# Patient Record
Sex: FEMALE | Race: WHITE | NOT HISPANIC OR LATINO | Employment: OTHER | URBAN - METROPOLITAN AREA
[De-identification: names, ages, dates, MRNs, and addresses within clinical notes are randomized per-mention and may not be internally consistent; named-entity substitution may affect disease eponyms.]

---

## 2023-02-20 LAB
ALANINE AMINOTRANSFERASE (SGPT) (U/L) IN SER/PLAS: 12 U/L (ref 7–45)
ALBUMIN (G/DL) IN SER/PLAS: 4.1 G/DL (ref 3.4–5)
ALKALINE PHOSPHATASE (U/L) IN SER/PLAS: 88 U/L (ref 33–136)
ANION GAP IN SER/PLAS: 13 MMOL/L (ref 10–20)
ASPARTATE AMINOTRANSFERASE (SGOT) (U/L) IN SER/PLAS: 15 U/L (ref 9–39)
BASOPHILS (10*3/UL) IN BLOOD BY AUTOMATED COUNT: 0.05 X10E9/L (ref 0–0.1)
BASOPHILS/100 LEUKOCYTES IN BLOOD BY AUTOMATED COUNT: 0.8 % (ref 0–2)
BILIRUBIN TOTAL (MG/DL) IN SER/PLAS: 0.8 MG/DL (ref 0–1.2)
CALCIDIOL (25 OH VITAMIN D3) (NG/ML) IN SER/PLAS: 47 NG/ML
CALCIUM (MG/DL) IN SER/PLAS: 9.4 MG/DL (ref 8.6–10.6)
CARBON DIOXIDE, TOTAL (MMOL/L) IN SER/PLAS: 31 MMOL/L (ref 21–32)
CHLORIDE (MMOL/L) IN SER/PLAS: 103 MMOL/L (ref 98–107)
CHOLESTEROL (MG/DL) IN SER/PLAS: 168 MG/DL (ref 0–199)
CHOLESTEROL IN HDL (MG/DL) IN SER/PLAS: 75.4 MG/DL
CHOLESTEROL/HDL RATIO: 2.2
CREATININE (MG/DL) IN SER/PLAS: 0.69 MG/DL (ref 0.5–1.05)
EOSINOPHILS (10*3/UL) IN BLOOD BY AUTOMATED COUNT: 0.37 X10E9/L (ref 0–0.4)
EOSINOPHILS/100 LEUKOCYTES IN BLOOD BY AUTOMATED COUNT: 6.2 % (ref 0–6)
ERYTHROCYTE DISTRIBUTION WIDTH (RATIO) BY AUTOMATED COUNT: 14.2 % (ref 11.5–14.5)
ERYTHROCYTE MEAN CORPUSCULAR HEMOGLOBIN CONCENTRATION (G/DL) BY AUTOMATED: 32.4 G/DL (ref 32–36)
ERYTHROCYTE MEAN CORPUSCULAR VOLUME (FL) BY AUTOMATED COUNT: 97 FL (ref 80–100)
ERYTHROCYTES (10*6/UL) IN BLOOD BY AUTOMATED COUNT: 4.44 X10E12/L (ref 4–5.2)
ESTIMATED AVERAGE GLUCOSE FOR HBA1C: 103 MG/DL
GFR FEMALE: 90 ML/MIN/1.73M2
GLUCOSE (MG/DL) IN SER/PLAS: 96 MG/DL (ref 74–99)
HEMATOCRIT (%) IN BLOOD BY AUTOMATED COUNT: 42.9 % (ref 36–46)
HEMOGLOBIN (G/DL) IN BLOOD: 13.9 G/DL (ref 12–16)
HEMOGLOBIN A1C/HEMOGLOBIN TOTAL IN BLOOD: 5.2 %
IMMATURE GRANULOCYTES/100 LEUKOCYTES IN BLOOD BY AUTOMATED COUNT: 0.2 % (ref 0–0.9)
LDL: 69 MG/DL (ref 0–99)
LEUKOCYTES (10*3/UL) IN BLOOD BY AUTOMATED COUNT: 6 X10E9/L (ref 4.4–11.3)
LYMPHOCYTES (10*3/UL) IN BLOOD BY AUTOMATED COUNT: 1.98 X10E9/L (ref 0.8–3)
LYMPHOCYTES/100 LEUKOCYTES IN BLOOD BY AUTOMATED COUNT: 33.2 % (ref 13–44)
MONOCYTES (10*3/UL) IN BLOOD BY AUTOMATED COUNT: 0.79 X10E9/L (ref 0.05–0.8)
MONOCYTES/100 LEUKOCYTES IN BLOOD BY AUTOMATED COUNT: 13.2 % (ref 2–10)
NEUTROPHILS (10*3/UL) IN BLOOD BY AUTOMATED COUNT: 2.77 X10E9/L (ref 1.6–5.5)
NEUTROPHILS/100 LEUKOCYTES IN BLOOD BY AUTOMATED COUNT: 46.4 % (ref 40–80)
NRBC (PER 100 WBCS) BY AUTOMATED COUNT: 0 /100 WBC (ref 0–0)
PLATELETS (10*3/UL) IN BLOOD AUTOMATED COUNT: 184 X10E9/L (ref 150–450)
POTASSIUM (MMOL/L) IN SER/PLAS: 4.5 MMOL/L (ref 3.5–5.3)
PROTEIN TOTAL: 7.1 G/DL (ref 6.4–8.2)
SODIUM (MMOL/L) IN SER/PLAS: 142 MMOL/L (ref 136–145)
THYROTROPIN (MIU/L) IN SER/PLAS BY DETECTION LIMIT <= 0.05 MIU/L: 2.05 MIU/L (ref 0.44–3.98)
TRIGLYCERIDE (MG/DL) IN SER/PLAS: 116 MG/DL (ref 0–149)
UREA NITROGEN (MG/DL) IN SER/PLAS: 19 MG/DL (ref 6–23)
VLDL: 23 MG/DL (ref 0–40)

## 2023-03-13 ENCOUNTER — TELEPHONE (OUTPATIENT)
Dept: PRIMARY CARE | Facility: CLINIC | Age: 73
End: 2023-03-13
Payer: MEDICARE

## 2023-03-13 NOTE — TELEPHONE ENCOUNTER
Pt informed of results. Wants to hold off on taking any bisphosphonate at this time.    ----- Message from Shabnam Bobo PA-C sent at 3/9/2023  9:47 PM EST -----  Regarding: DEXA bone scan results  DEXA bone scan results show osteopenia (weakening of the bones) in the left femur but normal bone density in the spine.  Her FRAX score is above goal which means that she is at a high risk for 10-year major osteoporotic fracture or hip fracture.  It is recommended that she begin oral bisphosphonate therapy weekly.  Please let me know if she is interested and I will call into the pharmacy.  Otherwise if not interested then I recommend she take calcium 600 mg plus vitamin D at least 400 units twice daily.  Participate in weightbearing exercises as tolerated.

## 2023-08-10 ENCOUNTER — OFFICE VISIT (OUTPATIENT)
Dept: PRIMARY CARE | Facility: CLINIC | Age: 73
End: 2023-08-10
Payer: MEDICARE

## 2023-08-10 VITALS
SYSTOLIC BLOOD PRESSURE: 123 MMHG | BODY MASS INDEX: 53.92 KG/M2 | HEART RATE: 61 BPM | WEIGHT: 293 LBS | HEIGHT: 62 IN | OXYGEN SATURATION: 97 % | DIASTOLIC BLOOD PRESSURE: 68 MMHG

## 2023-08-10 DIAGNOSIS — N32.81 OVERACTIVE BLADDER: ICD-10-CM

## 2023-08-10 DIAGNOSIS — Z12.31 ENCOUNTER FOR SCREENING MAMMOGRAM FOR BREAST CANCER: ICD-10-CM

## 2023-08-10 DIAGNOSIS — I89.0 LYMPHEDEMA: ICD-10-CM

## 2023-08-10 DIAGNOSIS — F32.9 MAJOR DEPRESSIVE DISORDER, REMISSION STATUS UNSPECIFIED, UNSPECIFIED WHETHER RECURRENT: ICD-10-CM

## 2023-08-10 DIAGNOSIS — Z00.00 ROUTINE GENERAL MEDICAL EXAMINATION AT HEALTH CARE FACILITY: ICD-10-CM

## 2023-08-10 DIAGNOSIS — I10 ESSENTIAL HYPERTENSION: ICD-10-CM

## 2023-08-10 DIAGNOSIS — Z00.00 MEDICARE ANNUAL WELLNESS VISIT, SUBSEQUENT: Primary | ICD-10-CM

## 2023-08-10 DIAGNOSIS — R06.00 NOCTURNAL DYSPNEA: ICD-10-CM

## 2023-08-10 DIAGNOSIS — Z13.89 ENCOUNTER FOR SCREENING FOR OTHER DISORDER: ICD-10-CM

## 2023-08-10 PROBLEM — J20.9 BRONCHITIS, ACUTE: Status: ACTIVE | Noted: 2023-08-10

## 2023-08-10 PROBLEM — M25.569 JOINT PAIN, KNEE: Status: ACTIVE | Noted: 2023-08-10

## 2023-08-10 PROBLEM — E66.9 OBESITY: Status: ACTIVE | Noted: 2023-08-10

## 2023-08-10 PROBLEM — M25.512 LEFT SHOULDER PAIN: Status: ACTIVE | Noted: 2023-08-10

## 2023-08-10 PROBLEM — E55.9 VITAMIN D DEFICIENCY: Status: ACTIVE | Noted: 2023-08-10

## 2023-08-10 PROBLEM — R39.15 URINARY URGENCY: Status: ACTIVE | Noted: 2023-08-10

## 2023-08-10 PROBLEM — R39.89 URINE TROUBLES: Status: ACTIVE | Noted: 2023-08-10

## 2023-08-10 PROBLEM — M10.9 GOUT: Status: ACTIVE | Noted: 2023-08-10

## 2023-08-10 PROBLEM — M51.369 LUMBAR DEGENERATIVE DISC DISEASE: Status: ACTIVE | Noted: 2023-08-10

## 2023-08-10 PROBLEM — U07.1 DISEASE DUE TO SEVERE ACUTE RESPIRATORY SYNDROME CORONAVIRUS 2 (SARS-COV-2): Status: ACTIVE | Noted: 2023-08-10

## 2023-08-10 PROBLEM — R79.9 ABNORMAL BLOOD CHEMISTRY: Status: ACTIVE | Noted: 2023-08-10

## 2023-08-10 PROBLEM — M54.50 LUMBAR PAIN: Status: ACTIVE | Noted: 2023-08-10

## 2023-08-10 PROBLEM — R05.9 COUGH: Status: ACTIVE | Noted: 2023-08-10

## 2023-08-10 PROBLEM — K63.5 COLON POLYPS: Status: ACTIVE | Noted: 2023-08-10

## 2023-08-10 PROBLEM — F32.A DEPRESSION: Status: ACTIVE | Noted: 2023-08-10

## 2023-08-10 PROBLEM — M19.90 ARTHRITIS: Status: ACTIVE | Noted: 2023-08-10

## 2023-08-10 PROBLEM — I87.2 CHRONIC VENOUS INSUFFICIENCY: Status: ACTIVE | Noted: 2023-08-10

## 2023-08-10 PROBLEM — J45.909 ASTHMATIC BRONCHITIS (HHS-HCC): Status: ACTIVE | Noted: 2023-08-10

## 2023-08-10 PROBLEM — M19.90 OSTEOARTHRITIS: Status: ACTIVE | Noted: 2023-08-10

## 2023-08-10 PROBLEM — M54.2 NECK PAIN: Status: ACTIVE | Noted: 2023-08-10

## 2023-08-10 PROBLEM — R01.1 HEART MURMUR: Status: ACTIVE | Noted: 2023-08-10

## 2023-08-10 PROBLEM — M51.36 LUMBAR DEGENERATIVE DISC DISEASE: Status: ACTIVE | Noted: 2023-08-10

## 2023-08-10 PROBLEM — M76.72 PERONEAL TENDONITIS OF LEFT LOWER EXTREMITY: Status: ACTIVE | Noted: 2023-08-10

## 2023-08-10 PROBLEM — D05.02 LOBULAR CARCINOMA IN SITU (LCIS) OF LEFT BREAST: Status: ACTIVE | Noted: 2023-08-10

## 2023-08-10 PROBLEM — L08.9 SKIN INFECTION, BACTERIAL: Status: ACTIVE | Noted: 2023-08-10

## 2023-08-10 PROBLEM — M25.551 RIGHT HIP PAIN: Status: ACTIVE | Noted: 2023-08-10

## 2023-08-10 PROBLEM — R35.0 URINARY FREQUENCY: Status: ACTIVE | Noted: 2023-08-10

## 2023-08-10 PROBLEM — E66.812 CLASS 2 SEVERE OBESITY DUE TO EXCESS CALORIES WITH SERIOUS COMORBIDITY IN ADULT: Status: ACTIVE | Noted: 2023-08-10

## 2023-08-10 PROBLEM — Z15.09: Status: ACTIVE | Noted: 2023-08-10

## 2023-08-10 PROBLEM — R60.9 EDEMA: Status: ACTIVE | Noted: 2023-08-10

## 2023-08-10 PROBLEM — M21.40 PES PLANUS: Status: ACTIVE | Noted: 2023-08-10

## 2023-08-10 PROBLEM — J96.00 ACUTE RESPIRATORY FAILURE (MULTI): Status: ACTIVE | Noted: 2023-08-10

## 2023-08-10 PROBLEM — R91.8 LUNG NODULES: Status: ACTIVE | Noted: 2023-08-10

## 2023-08-10 PROBLEM — E66.01 CLASS 2 SEVERE OBESITY DUE TO EXCESS CALORIES WITH SERIOUS COMORBIDITY IN ADULT (MULTI): Status: ACTIVE | Noted: 2023-08-10

## 2023-08-10 PROBLEM — R30.0 BURNING WITH URINATION: Status: ACTIVE | Noted: 2023-08-10

## 2023-08-10 PROBLEM — R53.83 FATIGUE: Status: ACTIVE | Noted: 2023-08-10

## 2023-08-10 PROBLEM — M25.572 ACUTE LEFT ANKLE PAIN: Status: ACTIVE | Noted: 2023-08-10

## 2023-08-10 PROBLEM — B96.89 SKIN INFECTION, BACTERIAL: Status: ACTIVE | Noted: 2023-08-10

## 2023-08-10 PROBLEM — N39.0 UTI (URINARY TRACT INFECTION): Status: ACTIVE | Noted: 2023-08-10

## 2023-08-10 PROBLEM — R06.2 WHEEZING: Status: ACTIVE | Noted: 2023-08-10

## 2023-08-10 PROBLEM — R00.2 PALPITATIONS: Status: ACTIVE | Noted: 2023-08-10

## 2023-08-10 PROBLEM — H60.591: Status: ACTIVE | Noted: 2023-08-10

## 2023-08-10 PROBLEM — R92.8 ABNORMAL MAMMOGRAM: Status: ACTIVE | Noted: 2023-08-10

## 2023-08-10 PROBLEM — R04.0 EPISTAXIS: Status: ACTIVE | Noted: 2023-08-10

## 2023-08-10 PROBLEM — M79.673 FOOT PAIN: Status: ACTIVE | Noted: 2023-08-10

## 2023-08-10 PROBLEM — M43.00 SPONDYLOLYSIS: Status: ACTIVE | Noted: 2023-08-10

## 2023-08-10 PROBLEM — J35.01 CHRONIC TONSILLITIS: Status: ACTIVE | Noted: 2023-08-10

## 2023-08-10 PROBLEM — K21.9 ESOPHAGEAL REFLUX: Status: ACTIVE | Noted: 2023-08-10

## 2023-08-10 PROBLEM — H92.09 OTALGIA: Status: ACTIVE | Noted: 2023-08-10

## 2023-08-10 PROBLEM — J31.0 CHRONIC RHINITIS: Status: ACTIVE | Noted: 2023-08-10

## 2023-08-10 PROBLEM — R07.89 ATYPICAL CHEST PAIN: Status: ACTIVE | Noted: 2023-08-10

## 2023-08-10 PROBLEM — M85.80 OSTEOPENIA: Status: ACTIVE | Noted: 2023-08-10

## 2023-08-10 PROBLEM — T14.8XXA BLISTER: Status: ACTIVE | Noted: 2023-08-10

## 2023-08-10 PROBLEM — R50.9 FEVER: Status: ACTIVE | Noted: 2023-08-10

## 2023-08-10 PROBLEM — R60.9 LIPOEDEMA: Status: ACTIVE | Noted: 2023-08-10

## 2023-08-10 PROBLEM — M54.9 NOTALGIA: Status: ACTIVE | Noted: 2023-08-10

## 2023-08-10 PROCEDURE — 1036F TOBACCO NON-USER: CPT | Performed by: PHYSICIAN ASSISTANT

## 2023-08-10 PROCEDURE — 99214 OFFICE O/P EST MOD 30 MIN: CPT | Performed by: PHYSICIAN ASSISTANT

## 2023-08-10 PROCEDURE — G0444 DEPRESSION SCREEN ANNUAL: HCPCS | Performed by: PHYSICIAN ASSISTANT

## 2023-08-10 PROCEDURE — G0439 PPPS, SUBSEQ VISIT: HCPCS | Performed by: PHYSICIAN ASSISTANT

## 2023-08-10 PROCEDURE — 3078F DIAST BP <80 MM HG: CPT | Performed by: PHYSICIAN ASSISTANT

## 2023-08-10 PROCEDURE — 1123F ACP DISCUSS/DSCN MKR DOCD: CPT | Performed by: PHYSICIAN ASSISTANT

## 2023-08-10 PROCEDURE — 1159F MED LIST DOCD IN RCRD: CPT | Performed by: PHYSICIAN ASSISTANT

## 2023-08-10 PROCEDURE — 1170F FXNL STATUS ASSESSED: CPT | Performed by: PHYSICIAN ASSISTANT

## 2023-08-10 PROCEDURE — 3074F SYST BP LT 130 MM HG: CPT | Performed by: PHYSICIAN ASSISTANT

## 2023-08-10 PROCEDURE — 1160F RVW MEDS BY RX/DR IN RCRD: CPT | Performed by: PHYSICIAN ASSISTANT

## 2023-08-10 PROCEDURE — 1033F TOBACCO NONSMOKER NOR 2NDHND: CPT | Performed by: PHYSICIAN ASSISTANT

## 2023-08-10 PROCEDURE — 1125F AMNT PAIN NOTED PAIN PRSNT: CPT | Performed by: PHYSICIAN ASSISTANT

## 2023-08-10 RX ORDER — ESCITALOPRAM OXALATE 10 MG/1
10 TABLET ORAL DAILY
Qty: 90 TABLET | Refills: 3 | Status: SHIPPED | OUTPATIENT
Start: 2023-08-10 | End: 2024-02-13 | Stop reason: WASHOUT

## 2023-08-10 RX ORDER — ACETAMINOPHEN 500 MG
1 TABLET ORAL DAILY
COMMUNITY

## 2023-08-10 RX ORDER — HYDROCHLOROTHIAZIDE 25 MG/1
25 TABLET ORAL DAILY
Qty: 90 TABLET | Refills: 1 | Status: SHIPPED | OUTPATIENT
Start: 2023-08-10 | End: 2024-02-01

## 2023-08-10 RX ORDER — DOXYCYCLINE 100 MG/1
CAPSULE ORAL
COMMUNITY
Start: 2023-05-16 | End: 2023-08-10 | Stop reason: ALTCHOICE

## 2023-08-10 RX ORDER — METRONIDAZOLE 10 MG/G
GEL TOPICAL 2 TIMES DAILY
COMMUNITY
Start: 2019-12-09 | End: 2023-08-10 | Stop reason: ALTCHOICE

## 2023-08-10 RX ORDER — METRONIDAZOLE 7.5 MG/G
GEL TOPICAL
COMMUNITY
Start: 2023-07-12

## 2023-08-10 RX ORDER — ZINC GLUCONATE 50 MG
1 TABLET ORAL DAILY
COMMUNITY
End: 2023-08-10 | Stop reason: ALTCHOICE

## 2023-08-10 RX ORDER — NEOMYCIN SULFATE, POLYMYXIN B SULFATE AND DEXAMETHASONE 3.5; 10000; 1 MG/ML; [USP'U]/ML; MG/ML
SUSPENSION/ DROPS OPHTHALMIC
COMMUNITY
Start: 2022-08-16 | End: 2023-08-10 | Stop reason: ALTCHOICE

## 2023-08-10 RX ORDER — MIRABEGRON 50 MG/1
50 TABLET, EXTENDED RELEASE ORAL DAILY
Qty: 90 TABLET | Refills: 0 | Status: SHIPPED | OUTPATIENT
Start: 2023-08-10 | End: 2023-11-03

## 2023-08-10 RX ORDER — HYDROCHLOROTHIAZIDE 25 MG/1
25 TABLET ORAL DAILY
COMMUNITY
End: 2023-08-10 | Stop reason: SDUPTHER

## 2023-08-10 RX ORDER — KETOCONAZOLE 20 MG/G
CREAM TOPICAL
COMMUNITY
Start: 2023-07-14 | End: 2023-08-10 | Stop reason: ALTCHOICE

## 2023-08-10 RX ORDER — ESCITALOPRAM OXALATE 10 MG/1
10 TABLET ORAL DAILY
COMMUNITY
End: 2023-08-10 | Stop reason: SDUPTHER

## 2023-08-10 RX ORDER — HYDROCORTISONE 25 MG/G
CREAM TOPICAL
COMMUNITY
Start: 2023-05-16 | End: 2023-08-10 | Stop reason: ALTCHOICE

## 2023-08-10 RX ORDER — NITROFURANTOIN 25; 75 MG/1; MG/1
1 CAPSULE ORAL 2 TIMES DAILY
COMMUNITY
Start: 2022-08-15 | End: 2023-08-10 | Stop reason: ALTCHOICE

## 2023-08-10 ASSESSMENT — ACTIVITIES OF DAILY LIVING (ADL)
MANAGING_FINANCES: INDEPENDENT
TAKING_MEDICATION: INDEPENDENT
DRESSING: DEPENDENT
DOING_HOUSEWORK: INDEPENDENT
BATHING: DEPENDENT
GROCERY_SHOPPING: INDEPENDENT

## 2023-08-10 ASSESSMENT — ENCOUNTER SYMPTOMS
OCCASIONAL FEELINGS OF UNSTEADINESS: 0
LOSS OF SENSATION IN FEET: 0
DEPRESSION: 0

## 2023-08-10 NOTE — PROGRESS NOTES
"Subjective   Reason for Visit: Grace Velazquez is an 73 y.o. female here for a Medicare Wellness visit.     Past Medical, Surgical, and Family History reviewed and updated in chart.    Reviewed all medications by prescribing practitioner or clinical pharmacist (such as prescriptions, OTCs, herbal therapies and supplements) and documented in the medical record.    HPI 72yo female presenting for a [facility] wellness visit. Overall doing well.     HTN: stable on hydrochlorothiazide 25mg. Denies HA, dizziness, chest pain, SOB/CURTIS.     Depression: stable on escitalopram 10mg. No thoughts of self/other harm or SI. Admits to feeling down on some days tasking effort to ambulate and leave the home.     History of acute respiratory failure and PE 2/2 to COVID (11/2022), lung nodules: follows with pulmonary medicine. She was instructed to continue supplemental 2L O2 NC at bedtime, but does not need any supplemental O2 during the day. Grace checks her pulse oximetry at home and reports levels WNL.     OAB: follows with UroGyn. S/p cystoscopy + intradetrusor botox INJ. She did have samples of myrbetriq that she has been taking, needs a prescription now. States symptoms are improving with the myrbetriq. No longer has gushing of urine when transitioning to standing.     Lymphedema, lipoedema: wearing compression wraps, elevating LEs, does leg exercise. Trying to be more mobile but feels limited d/t difficulty ambulating.     Health maintenance:  Immunizations:  -Flu: deferred to fall 2023  -Pneumococcal: UTD  -Shingrix: recommended from pharmacy  -Tdap: UTD, last 2015  Mammogram UTD (last 9/23/22) - ordered 8/10/23  Colonoscopy UTD (last 9/9/21) - 5 yrs   DEXA UTD (last 2/24/23)   Has healthcare POA/ living will    Last [facility]: 8/10/23 ([facility])    Patient Care Team:  Shabnam Bobo PA-C as PCP - General  Shabnam Bobo PA-C     Objective   Vitals:  /68   Pulse 61   Ht 1.58 m (5' 2.21\")   Wt 149 kg (328 lb)   SpO2 97%   BMI " 59.60 kg/m²       Physical Exam  Vitals reviewed.   Constitutional:       General: She is not in acute distress.     Appearance: Normal appearance.   HENT:      Head: Normocephalic and atraumatic.      Right Ear: Tympanic membrane, ear canal and external ear normal. There is no impacted cerumen.      Left Ear: Tympanic membrane, ear canal and external ear normal. There is no impacted cerumen.      Nose: Nose normal. No congestion or rhinorrhea.      Mouth/Throat:      Mouth: Mucous membranes are moist.      Pharynx: Oropharynx is clear. No oropharyngeal exudate or posterior oropharyngeal erythema.   Eyes:      General: No scleral icterus.        Right eye: No discharge.         Left eye: No discharge.      Extraocular Movements: Extraocular movements intact.      Conjunctiva/sclera: Conjunctivae normal.      Pupils: Pupils are equal, round, and reactive to light.   Cardiovascular:      Rate and Rhythm: Normal rate and regular rhythm.      Heart sounds: Normal heart sounds. No murmur heard.     No friction rub. No gallop.   Pulmonary:      Effort: Pulmonary effort is normal. No respiratory distress.      Breath sounds: Normal breath sounds. No stridor. No wheezing, rhonchi or rales.   Abdominal:      General: Bowel sounds are normal. There is no distension.      Palpations: Abdomen is soft. There is no mass.      Tenderness: There is no abdominal tenderness. There is no right CVA tenderness or left CVA tenderness.   Musculoskeletal:         General: Normal range of motion.      Cervical back: Normal range of motion and neck supple.      Right lower leg: No edema.      Left lower leg: No edema.   Skin:     General: Skin is warm and dry.      Findings: No rash.   Neurological:      General: No focal deficit present.      Mental Status: She is alert and oriented to person, place, and time. Mental status is at baseline.      Cranial Nerves: No cranial nerve deficit.      Gait: Gait normal.   Psychiatric:         Mood and  Affect: Mood normal.         Behavior: Behavior normal.         Assessment/Plan   Problem List Items Addressed This Visit       Depression    Current Assessment & Plan     Continue escitalopram 10mg. Report any thoughts of self/other harm or SI.          Relevant Medications    escitalopram (Lexapro) 10 mg tablet    Essential hypertension    Current Assessment & Plan     Well controlled. Continue hydrochlorothiazide 25mg. Follow a strict DASH diet and exercise as tolerated.          Relevant Medications    hydroCHLOROthiazide (HYDRODiuril) 25 mg tablet    Other Relevant Orders    Lipid panel    Comprehensive metabolic panel    CBC and Auto Differential    Lymphedema    Current Assessment & Plan     Continue compression stockings, leg elevation, regular exercise. Ambulate with a walker. Prescription for a rollator walker with a seat provided.          Overactive bladder    Current Assessment & Plan     Continue myrbetriq 50mg, refills sent to pharmacy.          Relevant Medications    mirabegron (Myrbetriq) 50 mg tablet extended release 24 hr 24 hr tablet    Routine general medical examination at health care facility    Current Assessment & Plan     Discussed age appropriate preventive health measures. Labs ordered          Nocturnal dyspnea    Current Assessment & Plan     Continue nocturnal 2L/min O2. Follow with pulmonary medicine.           Other Visit Diagnoses       Medicare annual wellness visit, subsequent    -  Primary    Encounter for screening for other disorder        Encounter for screening mammogram for breast cancer        Relevant Orders    BI mammo bilateral screening tomosynthesis             Follow up in 6 months or sooner as needed

## 2023-08-11 PROBLEM — U07.1 DISEASE DUE TO SEVERE ACUTE RESPIRATORY SYNDROME CORONAVIRUS 2 (SARS-COV-2): Status: RESOLVED | Noted: 2023-08-10 | Resolved: 2023-08-11

## 2023-08-11 PROBLEM — N39.0 UTI (URINARY TRACT INFECTION): Status: RESOLVED | Noted: 2023-08-10 | Resolved: 2023-08-11

## 2023-08-11 PROBLEM — E66.01 CLASS 2 SEVERE OBESITY DUE TO EXCESS CALORIES WITH SERIOUS COMORBIDITY IN ADULT (MULTI): Status: RESOLVED | Noted: 2023-08-10 | Resolved: 2023-08-11

## 2023-08-11 PROBLEM — M25.572 ACUTE LEFT ANKLE PAIN: Status: RESOLVED | Noted: 2023-08-10 | Resolved: 2023-08-11

## 2023-08-11 PROBLEM — J20.9 BRONCHITIS, ACUTE: Status: RESOLVED | Noted: 2023-08-10 | Resolved: 2023-08-11

## 2023-08-11 PROBLEM — J96.00 ACUTE RESPIRATORY FAILURE (MULTI): Status: RESOLVED | Noted: 2023-08-10 | Resolved: 2023-08-11

## 2023-08-11 PROBLEM — B96.89 SKIN INFECTION, BACTERIAL: Status: RESOLVED | Noted: 2023-08-10 | Resolved: 2023-08-11

## 2023-08-11 PROBLEM — R06.00 NOCTURNAL DYSPNEA: Status: ACTIVE | Noted: 2023-08-11

## 2023-08-11 PROBLEM — E66.812 CLASS 2 SEVERE OBESITY DUE TO EXCESS CALORIES WITH SERIOUS COMORBIDITY IN ADULT: Status: RESOLVED | Noted: 2023-08-10 | Resolved: 2023-08-11

## 2023-08-11 PROBLEM — Z00.00 ROUTINE GENERAL MEDICAL EXAMINATION AT HEALTH CARE FACILITY: Status: ACTIVE | Noted: 2023-08-11

## 2023-08-11 PROBLEM — L08.9 SKIN INFECTION, BACTERIAL: Status: RESOLVED | Noted: 2023-08-10 | Resolved: 2023-08-11

## 2023-08-11 PROBLEM — H60.591: Status: RESOLVED | Noted: 2023-08-10 | Resolved: 2023-08-11

## 2023-08-11 PROBLEM — R06.2 WHEEZING: Status: RESOLVED | Noted: 2023-08-10 | Resolved: 2023-08-11

## 2023-08-11 ASSESSMENT — PATIENT HEALTH QUESTIONNAIRE - PHQ9
1. LITTLE INTEREST OR PLEASURE IN DOING THINGS: NOT AT ALL
2. FEELING DOWN, DEPRESSED OR HOPELESS: NOT AT ALL
SUM OF ALL RESPONSES TO PHQ9 QUESTIONS 1 AND 2: 0

## 2023-08-11 NOTE — ASSESSMENT & PLAN NOTE
Continue compression stockings, leg elevation, regular exercise. Ambulate with a walker. Prescription for a rollator walker with a seat provided.

## 2023-08-11 NOTE — ASSESSMENT & PLAN NOTE
Well controlled. Continue hydrochlorothiazide 25mg. Follow a strict DASH diet and exercise as tolerated.

## 2023-08-14 ENCOUNTER — LAB (OUTPATIENT)
Dept: LAB | Facility: LAB | Age: 73
End: 2023-08-14
Payer: MEDICARE

## 2023-08-14 DIAGNOSIS — I10 ESSENTIAL HYPERTENSION: ICD-10-CM

## 2023-08-14 LAB
ALANINE AMINOTRANSFERASE (SGPT) (U/L) IN SER/PLAS: 9 U/L (ref 7–45)
ALBUMIN (G/DL) IN SER/PLAS: 3.9 G/DL (ref 3.4–5)
ALKALINE PHOSPHATASE (U/L) IN SER/PLAS: 88 U/L (ref 33–136)
ANION GAP IN SER/PLAS: 13 MMOL/L (ref 10–20)
ASPARTATE AMINOTRANSFERASE (SGOT) (U/L) IN SER/PLAS: 15 U/L (ref 9–39)
BASOPHILS (10*3/UL) IN BLOOD BY AUTOMATED COUNT: 0.09 X10E9/L (ref 0–0.1)
BASOPHILS/100 LEUKOCYTES IN BLOOD BY AUTOMATED COUNT: 1.4 % (ref 0–2)
BILIRUBIN TOTAL (MG/DL) IN SER/PLAS: 0.8 MG/DL (ref 0–1.2)
CALCIUM (MG/DL) IN SER/PLAS: 9.3 MG/DL (ref 8.6–10.6)
CARBON DIOXIDE, TOTAL (MMOL/L) IN SER/PLAS: 29 MMOL/L (ref 21–32)
CHLORIDE (MMOL/L) IN SER/PLAS: 105 MMOL/L (ref 98–107)
CHOLESTEROL (MG/DL) IN SER/PLAS: 154 MG/DL (ref 0–199)
CHOLESTEROL IN HDL (MG/DL) IN SER/PLAS: 69.1 MG/DL
CHOLESTEROL/HDL RATIO: 2.2
CREATININE (MG/DL) IN SER/PLAS: 0.72 MG/DL (ref 0.5–1.05)
EOSINOPHILS (10*3/UL) IN BLOOD BY AUTOMATED COUNT: 0.47 X10E9/L (ref 0–0.4)
EOSINOPHILS/100 LEUKOCYTES IN BLOOD BY AUTOMATED COUNT: 7.2 % (ref 0–6)
ERYTHROCYTE DISTRIBUTION WIDTH (RATIO) BY AUTOMATED COUNT: 14.4 % (ref 11.5–14.5)
ERYTHROCYTE MEAN CORPUSCULAR HEMOGLOBIN CONCENTRATION (G/DL) BY AUTOMATED: 33.1 G/DL (ref 32–36)
ERYTHROCYTE MEAN CORPUSCULAR VOLUME (FL) BY AUTOMATED COUNT: 95 FL (ref 80–100)
ERYTHROCYTES (10*6/UL) IN BLOOD BY AUTOMATED COUNT: 4.63 X10E12/L (ref 4–5.2)
GFR FEMALE: 87 ML/MIN/1.73M2
GLUCOSE (MG/DL) IN SER/PLAS: 95 MG/DL (ref 74–99)
HEMATOCRIT (%) IN BLOOD BY AUTOMATED COUNT: 43.8 % (ref 36–46)
HEMOGLOBIN (G/DL) IN BLOOD: 14.5 G/DL (ref 12–16)
IMMATURE GRANULOCYTES/100 LEUKOCYTES IN BLOOD BY AUTOMATED COUNT: 0.2 % (ref 0–0.9)
LDL: 64 MG/DL (ref 0–99)
LEUKOCYTES (10*3/UL) IN BLOOD BY AUTOMATED COUNT: 6.5 X10E9/L (ref 4.4–11.3)
LYMPHOCYTES (10*3/UL) IN BLOOD BY AUTOMATED COUNT: 2.23 X10E9/L (ref 0.8–3)
LYMPHOCYTES/100 LEUKOCYTES IN BLOOD BY AUTOMATED COUNT: 34.2 % (ref 13–44)
MONOCYTES (10*3/UL) IN BLOOD BY AUTOMATED COUNT: 0.8 X10E9/L (ref 0.05–0.8)
MONOCYTES/100 LEUKOCYTES IN BLOOD BY AUTOMATED COUNT: 12.3 % (ref 2–10)
NEUTROPHILS (10*3/UL) IN BLOOD BY AUTOMATED COUNT: 2.93 X10E9/L (ref 1.6–5.5)
NEUTROPHILS/100 LEUKOCYTES IN BLOOD BY AUTOMATED COUNT: 44.7 % (ref 40–80)
NRBC (PER 100 WBCS) BY AUTOMATED COUNT: 0 /100 WBC (ref 0–0)
PLATELETS (10*3/UL) IN BLOOD AUTOMATED COUNT: 206 X10E9/L (ref 150–450)
POTASSIUM (MMOL/L) IN SER/PLAS: 4.4 MMOL/L (ref 3.5–5.3)
PROTEIN TOTAL: 7 G/DL (ref 6.4–8.2)
SODIUM (MMOL/L) IN SER/PLAS: 143 MMOL/L (ref 136–145)
TRIGLYCERIDE (MG/DL) IN SER/PLAS: 106 MG/DL (ref 0–149)
UREA NITROGEN (MG/DL) IN SER/PLAS: 18 MG/DL (ref 6–23)
VLDL: 21 MG/DL (ref 0–40)

## 2023-08-14 PROCEDURE — 85025 COMPLETE CBC W/AUTO DIFF WBC: CPT

## 2023-08-14 PROCEDURE — 80061 LIPID PANEL: CPT

## 2023-08-14 PROCEDURE — 36415 COLL VENOUS BLD VENIPUNCTURE: CPT

## 2023-08-14 PROCEDURE — 80053 COMPREHEN METABOLIC PANEL: CPT

## 2023-11-03 DIAGNOSIS — N32.81 OVERACTIVE BLADDER: ICD-10-CM

## 2023-11-03 RX ORDER — MIRABEGRON 50 MG/1
50 TABLET, FILM COATED, EXTENDED RELEASE ORAL DAILY
Qty: 90 TABLET | Refills: 0 | Status: SHIPPED | OUTPATIENT
Start: 2023-11-03 | End: 2024-02-13 | Stop reason: SDUPTHER

## 2024-02-01 DIAGNOSIS — I10 ESSENTIAL HYPERTENSION: ICD-10-CM

## 2024-02-01 RX ORDER — HYDROCHLOROTHIAZIDE 25 MG/1
25 TABLET ORAL DAILY
Qty: 90 TABLET | Refills: 1 | Status: SHIPPED | OUTPATIENT
Start: 2024-02-01

## 2024-02-12 ENCOUNTER — APPOINTMENT (OUTPATIENT)
Dept: PRIMARY CARE | Facility: CLINIC | Age: 74
End: 2024-02-12
Payer: MEDICARE

## 2024-02-13 ENCOUNTER — LAB (OUTPATIENT)
Dept: LAB | Facility: LAB | Age: 74
End: 2024-02-13
Payer: MEDICARE

## 2024-02-13 ENCOUNTER — OFFICE VISIT (OUTPATIENT)
Dept: PRIMARY CARE | Facility: CLINIC | Age: 74
End: 2024-02-13
Payer: MEDICARE

## 2024-02-13 VITALS
HEART RATE: 74 BPM | WEIGHT: 293 LBS | DIASTOLIC BLOOD PRESSURE: 70 MMHG | OXYGEN SATURATION: 96 % | SYSTOLIC BLOOD PRESSURE: 131 MMHG | BODY MASS INDEX: 59.96 KG/M2

## 2024-02-13 DIAGNOSIS — R53.83 FATIGUE, UNSPECIFIED TYPE: ICD-10-CM

## 2024-02-13 DIAGNOSIS — G47.34 IDIOPATHIC SLEEP RELATED NONOBSTRUCTIVE ALVEOLAR HYPOVENTILATION: ICD-10-CM

## 2024-02-13 DIAGNOSIS — I89.0 LYMPHEDEMA: ICD-10-CM

## 2024-02-13 DIAGNOSIS — N32.81 OVERACTIVE BLADDER: ICD-10-CM

## 2024-02-13 DIAGNOSIS — E55.9 VITAMIN D DEFICIENCY: ICD-10-CM

## 2024-02-13 DIAGNOSIS — I10 ESSENTIAL HYPERTENSION: ICD-10-CM

## 2024-02-13 DIAGNOSIS — I10 ESSENTIAL HYPERTENSION: Primary | ICD-10-CM

## 2024-02-13 PROBLEM — R50.9 FEVER: Status: RESOLVED | Noted: 2023-08-10 | Resolved: 2024-02-13

## 2024-02-13 PROBLEM — I26.99 ACUTE PULMONARY EMBOLISM (MULTI): Status: ACTIVE | Noted: 2024-02-13

## 2024-02-13 PROBLEM — N39.0 ACUTE LOWER URINARY TRACT INFECTION: Status: ACTIVE | Noted: 2024-02-13

## 2024-02-13 PROBLEM — R30.0 BURNING WITH URINATION: Status: RESOLVED | Noted: 2023-08-10 | Resolved: 2024-02-13

## 2024-02-13 PROBLEM — N39.0 ACUTE LOWER URINARY TRACT INFECTION: Status: RESOLVED | Noted: 2024-02-13 | Resolved: 2024-02-13

## 2024-02-13 PROBLEM — T14.8XXA BLISTER: Status: RESOLVED | Noted: 2023-08-10 | Resolved: 2024-02-13

## 2024-02-13 PROBLEM — H92.09 OTALGIA: Status: RESOLVED | Noted: 2023-08-10 | Resolved: 2024-02-13
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
## 2024-02-13 PROBLEM — E78.5 HYPERLIPIDEMIA: Status: ACTIVE | Noted: 2024-02-13

## 2024-02-13 PROBLEM — R04.0 EPISTAXIS: Status: RESOLVED | Noted: 2023-08-10 | Resolved: 2024-02-13

## 2024-02-13 PROBLEM — E46 MALNUTRITION (MULTI): Status: ACTIVE | Noted: 2024-02-13

## 2024-02-13 PROBLEM — I87.8 VENOUS STASIS: Status: ACTIVE | Noted: 2024-02-13

## 2024-02-13 PROBLEM — J18.9 PNEUMONIA: Status: ACTIVE | Noted: 2024-02-13
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
## 2024-02-13 PROBLEM — I26.99 ACUTE PULMONARY EMBOLISM (MULTI): Status: RESOLVED | Noted: 2024-02-13 | Resolved: 2024-02-13

## 2024-02-13 LAB
25(OH)D3 SERPL-MCNC: 66 NG/ML (ref 30–100)
ALBUMIN SERPL BCP-MCNC: 4 G/DL (ref 3.4–5)
ALP SERPL-CCNC: 100 U/L (ref 33–136)
ALT SERPL W P-5'-P-CCNC: 10 U/L (ref 7–45)
ANION GAP SERPL CALC-SCNC: 12 MMOL/L (ref 10–20)
AST SERPL W P-5'-P-CCNC: 14 U/L (ref 9–39)
BASOPHILS # BLD AUTO: 0.07 X10*3/UL (ref 0–0.1)
BASOPHILS NFR BLD AUTO: 1.2 %
BILIRUB SERPL-MCNC: 0.8 MG/DL (ref 0–1.2)
BUN SERPL-MCNC: 18 MG/DL (ref 6–23)
CALCIUM SERPL-MCNC: 10 MG/DL (ref 8.6–10.6)
CHLORIDE SERPL-SCNC: 104 MMOL/L (ref 98–107)
CHOLEST SERPL-MCNC: 160 MG/DL (ref 0–199)
CHOLESTEROL/HDL RATIO: 2.3
CO2 SERPL-SCNC: 32 MMOL/L (ref 21–32)
CREAT SERPL-MCNC: 0.74 MG/DL (ref 0.5–1.05)
EGFRCR SERPLBLD CKD-EPI 2021: 84 ML/MIN/1.73M*2
EOSINOPHIL # BLD AUTO: 0.39 X10*3/UL (ref 0–0.4)
EOSINOPHIL NFR BLD AUTO: 6.4 %
ERYTHROCYTE [DISTWIDTH] IN BLOOD BY AUTOMATED COUNT: 14.2 % (ref 11.5–14.5)
GLUCOSE SERPL-MCNC: 102 MG/DL (ref 74–99)
HCT VFR BLD AUTO: 43.8 % (ref 36–46)
HDLC SERPL-MCNC: 70.6 MG/DL
HGB BLD-MCNC: 14.2 G/DL (ref 12–16)
IMM GRANULOCYTES # BLD AUTO: 0.01 X10*3/UL (ref 0–0.5)
IMM GRANULOCYTES NFR BLD AUTO: 0.2 % (ref 0–0.9)
LDLC SERPL CALC-MCNC: 64 MG/DL
LYMPHOCYTES # BLD AUTO: 2.21 X10*3/UL (ref 0.8–3)
LYMPHOCYTES NFR BLD AUTO: 36.5 %
MCH RBC QN AUTO: 31.5 PG (ref 26–34)
MCHC RBC AUTO-ENTMCNC: 32.4 G/DL (ref 32–36)
MCV RBC AUTO: 97 FL (ref 80–100)
MONOCYTES # BLD AUTO: 0.74 X10*3/UL (ref 0.05–0.8)
MONOCYTES NFR BLD AUTO: 12.2 %
NEUTROPHILS # BLD AUTO: 2.64 X10*3/UL (ref 1.6–5.5)
NEUTROPHILS NFR BLD AUTO: 43.5 %
NON HDL CHOLESTEROL: 89 MG/DL (ref 0–149)
NRBC BLD-RTO: 0 /100 WBCS (ref 0–0)
PLATELET # BLD AUTO: 162 X10*3/UL (ref 150–450)
POTASSIUM SERPL-SCNC: 4.4 MMOL/L (ref 3.5–5.3)
PROT SERPL-MCNC: 7.3 G/DL (ref 6.4–8.2)
RBC # BLD AUTO: 4.51 X10*6/UL (ref 4–5.2)
SODIUM SERPL-SCNC: 144 MMOL/L (ref 136–145)
TRIGL SERPL-MCNC: 127 MG/DL (ref 0–149)
TSH SERPL-ACNC: 1.99 MIU/L (ref 0.44–3.98)
VLDL: 25 MG/DL (ref 0–40)
WBC # BLD AUTO: 6.1 X10*3/UL (ref 4.4–11.3)

## 2024-02-13 PROCEDURE — 85025 COMPLETE CBC W/AUTO DIFF WBC: CPT

## 2024-02-13 PROCEDURE — 80053 COMPREHEN METABOLIC PANEL: CPT

## 2024-02-13 PROCEDURE — 1159F MED LIST DOCD IN RCRD: CPT | Performed by: PHYSICIAN ASSISTANT

## 2024-02-13 PROCEDURE — 1125F AMNT PAIN NOTED PAIN PRSNT: CPT | Performed by: PHYSICIAN ASSISTANT

## 2024-02-13 PROCEDURE — 82306 VITAMIN D 25 HYDROXY: CPT

## 2024-02-13 PROCEDURE — 3078F DIAST BP <80 MM HG: CPT | Performed by: PHYSICIAN ASSISTANT

## 2024-02-13 PROCEDURE — 36415 COLL VENOUS BLD VENIPUNCTURE: CPT

## 2024-02-13 PROCEDURE — 84443 ASSAY THYROID STIM HORMONE: CPT

## 2024-02-13 PROCEDURE — 80061 LIPID PANEL: CPT

## 2024-02-13 PROCEDURE — 99213 OFFICE O/P EST LOW 20 MIN: CPT | Performed by: PHYSICIAN ASSISTANT

## 2024-02-13 PROCEDURE — 1160F RVW MEDS BY RX/DR IN RCRD: CPT | Performed by: PHYSICIAN ASSISTANT

## 2024-02-13 PROCEDURE — 1036F TOBACCO NON-USER: CPT | Performed by: PHYSICIAN ASSISTANT

## 2024-02-13 PROCEDURE — 1157F ADVNC CARE PLAN IN RCRD: CPT | Performed by: PHYSICIAN ASSISTANT

## 2024-02-13 PROCEDURE — 3075F SYST BP GE 130 - 139MM HG: CPT | Performed by: PHYSICIAN ASSISTANT

## 2024-02-13 RX ORDER — KETOCONAZOLE 20 MG/G
1 CREAM TOPICAL DAILY PRN
COMMUNITY

## 2024-02-13 RX ORDER — MULTIVIT-MIN/IRON FUM/FOLIC AC 7.5 MG-4
1 TABLET ORAL DAILY
COMMUNITY

## 2024-02-13 RX ORDER — MIRABEGRON 50 MG/1
50 TABLET, EXTENDED RELEASE ORAL DAILY
Qty: 90 TABLET | Refills: 1 | Status: SHIPPED | OUTPATIENT
Start: 2024-02-13

## 2024-02-13 ASSESSMENT — PATIENT HEALTH QUESTIONNAIRE - PHQ9
SUM OF ALL RESPONSES TO PHQ9 QUESTIONS 1 AND 2: 0
2. FEELING DOWN, DEPRESSED OR HOPELESS: NOT AT ALL
1. LITTLE INTEREST OR PLEASURE IN DOING THINGS: NOT AT ALL

## 2024-02-13 NOTE — ASSESSMENT & PLAN NOTE
Continue compression stockings, leg elevation, regular exercise. Use a walker to prevent falls. Consider PT/aquatic therapy

## 2024-02-13 NOTE — PROGRESS NOTES
Subjective   Grace Velazquez is a 74 y.o. female who presents for 6month f/u. Generally doing well. No new complaints.    HTN: controlled on hydrochlorothiazide 25mg. Does not check BP at home. Denies HA, dizziness, chest pain, SOB/CURTIS.     Depression: self-discontinued escitalopram 10mg. No thoughts of self/other harm or SI. Admits to feeling down on some days d/t tasking effort to ambulate and leave the home.     History of acute respiratory failure and PE 2/2 to COVID (11/2022), lung nodules, idiopathic sleep related nonobstructive alveolar hypoventilation syndrome: follows with pulmonary medicine (Dr. Doss). She was instructed to continue supplemental 2-3L O2 NC at bedtime, but does not need any supplemental O2 during the day. Grace checks her pulse oximetry at home and reports levels WNL.     OAB: previously followed with UroGyn. S/p cystoscopy + intradetrusor botox INJ. Taking myrbetriq 50mg. Daily with improvement of symptoms. Notes not waking as often during the night to urinate. Currently urinates every 3-4hrs during the day. Denies any anticholinergic side effects.     Lymphedema, lipoedema: wearing compression wraps, elevating LEs, does leg exercises. Trying to be more mobile but feels limited d/t difficulty ambulating. Ambulates with a rollator walker. Previously participated in land/aquatic PT and felt it benefited    Health maintenance:  Immunizations:  -Flu: declined this year    -Pneumococcal: UTD  -Shingrix: recommended from pharmacy  -Tdap: UTD, last 2015  Mammogram UTD (last 9/25/23)   Colonoscopy UTD (last 9/9/21) - 5 yrs   DEXA UTD (last 2/24/23)   Has healthcare POA/ living will    Last MCR: 8/10/23 (plain MCR)    12 point ROS reviewed and negative other than as stated in HPI    /70   Pulse 74   Wt 150 kg (330 lb)   SpO2 96%   BMI 59.96 kg/m²   Objective   Physical Exam  Vitals reviewed.   Constitutional:       General: She is not in acute distress.     Appearance: Normal  appearance. She is not ill-appearing.   HENT:      Head: Normocephalic and atraumatic.   Eyes:      General: No scleral icterus.     Extraocular Movements: Extraocular movements intact.      Conjunctiva/sclera: Conjunctivae normal.      Pupils: Pupils are equal, round, and reactive to light.   Cardiovascular:      Rate and Rhythm: Normal rate and regular rhythm.      Heart sounds: Normal heart sounds. No murmur heard.     No friction rub. No gallop.   Pulmonary:      Effort: Pulmonary effort is normal. No respiratory distress.      Breath sounds: Normal breath sounds. No stridor. No wheezing, rhonchi or rales.   Musculoskeletal:      Cervical back: Normal range of motion.      Right lower leg: Edema present.      Left lower leg: Edema present.      Comments: Ambulates with a rollator. Lymphedema and lipoedema of BLE   Skin:     General: Skin is warm and dry.   Neurological:      Mental Status: She is alert and oriented to person, place, and time. Mental status is at baseline.      Cranial Nerves: No cranial nerve deficit.      Gait: Gait normal.   Psychiatric:         Mood and Affect: Mood normal.         Behavior: Behavior normal.         Assessment/Plan   Problem List Items Addressed This Visit       Essential hypertension - Primary     Well controlled. Continue hydrochlorothiazide 25mg. Follow a strict DASH diet and exercise as tolerated.          Relevant Orders    Lipid panel    Comprehensive metabolic panel    CBC and Auto Differential    Fatigue     TSH w/ reflex free T4, vitamin D, CBC w/ diff, CMP ordered         Relevant Orders    Tsh With Reflex To Free T4 If Abnormal    Lymphedema     Continue compression stockings, leg elevation, regular exercise. Use a walker to prevent falls. Consider PT/aquatic therapy          Overactive bladder     Continue myrbetriq 50mg, refills sent to pharmacy.          Relevant Medications    mirabegron (Myrbetriq) 50 mg tablet extended release 24 hr 24 hr tablet    Vitamin D  deficiency     Vitamin D level ordered. Take OTC vitamin D3 2000units daily with food          Relevant Orders    Vitamin D 25-Hydroxy,Total (for eval of Vitamin D levels)    Idiopathic sleep related nonobstructive alveolar hypoventilation     Continue supplemental O2 2-3L/min at night. Follow with pulmonary medicine             Follow up in 6 months for MCR wellness or sooner as needed    Detail Level: Zone Discontinue Regimen: Doxycycline Initiate Treatment: clindamycin phosphate 1 % topical swab; Apply twice a days to acne on face Continue Regimen: clindamycin 1 % topical gel; Apply to face in the morning daily\\ntretinoin 0.1 % topical cream; Apply QHS to acne Plan: Patient will follow up in 3 months for further evaluation and treatment

## 2024-02-16 NOTE — RESULT ENCOUNTER NOTE
Overall labs look great.  Glucose level was slightly elevated, but we will continue to monitor it.  In the meantime, cut back on carbohydrates and sugary drinks/foods in the diet.

## 2024-05-20 ENCOUNTER — DOCUMENTATION (OUTPATIENT)
Dept: HOME HEALTH SERVICES | Facility: HOME HEALTH | Age: 74
End: 2024-05-20

## 2024-05-20 ENCOUNTER — OFFICE VISIT (OUTPATIENT)
Dept: PRIMARY CARE | Facility: CLINIC | Age: 74
End: 2024-05-20
Payer: MEDICARE

## 2024-05-20 ENCOUNTER — HOME HEALTH ADMISSION (OUTPATIENT)
Dept: HOME HEALTH SERVICES | Facility: HOME HEALTH | Age: 74
End: 2024-05-20
Payer: MEDICARE

## 2024-05-20 VITALS
WEIGHT: 293 LBS | OXYGEN SATURATION: 97 % | SYSTOLIC BLOOD PRESSURE: 128 MMHG | BODY MASS INDEX: 60.47 KG/M2 | DIASTOLIC BLOOD PRESSURE: 62 MMHG | HEART RATE: 81 BPM

## 2024-05-20 DIAGNOSIS — R60.9 LIPOEDEMA: ICD-10-CM

## 2024-05-20 DIAGNOSIS — I10 ESSENTIAL HYPERTENSION: Primary | ICD-10-CM

## 2024-05-20 DIAGNOSIS — I89.0 LYMPHEDEMA: ICD-10-CM

## 2024-05-20 DIAGNOSIS — M25.541 ARTHRALGIA OF BOTH HANDS: ICD-10-CM

## 2024-05-20 DIAGNOSIS — L71.9 ROSACEA: ICD-10-CM

## 2024-05-20 DIAGNOSIS — M25.542 ARTHRALGIA OF BOTH HANDS: ICD-10-CM

## 2024-05-20 DIAGNOSIS — L30.4 INTERTRIGO: ICD-10-CM

## 2024-05-20 PROCEDURE — 99214 OFFICE O/P EST MOD 30 MIN: CPT | Performed by: PHYSICIAN ASSISTANT

## 2024-05-20 PROCEDURE — 1036F TOBACCO NON-USER: CPT | Performed by: PHYSICIAN ASSISTANT

## 2024-05-20 PROCEDURE — 1160F RVW MEDS BY RX/DR IN RCRD: CPT | Performed by: PHYSICIAN ASSISTANT

## 2024-05-20 PROCEDURE — 1125F AMNT PAIN NOTED PAIN PRSNT: CPT | Performed by: PHYSICIAN ASSISTANT

## 2024-05-20 PROCEDURE — 1158F ADVNC CARE PLAN TLK DOCD: CPT | Performed by: PHYSICIAN ASSISTANT

## 2024-05-20 PROCEDURE — 3074F SYST BP LT 130 MM HG: CPT | Performed by: PHYSICIAN ASSISTANT

## 2024-05-20 PROCEDURE — 1157F ADVNC CARE PLAN IN RCRD: CPT | Performed by: PHYSICIAN ASSISTANT

## 2024-05-20 PROCEDURE — 1123F ACP DISCUSS/DSCN MKR DOCD: CPT | Performed by: PHYSICIAN ASSISTANT

## 2024-05-20 PROCEDURE — 1159F MED LIST DOCD IN RCRD: CPT | Performed by: PHYSICIAN ASSISTANT

## 2024-05-20 PROCEDURE — 99204 OFFICE O/P NEW MOD 45 MIN: CPT | Performed by: PHYSICIAN ASSISTANT

## 2024-05-20 PROCEDURE — 3078F DIAST BP <80 MM HG: CPT | Performed by: PHYSICIAN ASSISTANT

## 2024-05-20 ASSESSMENT — ENCOUNTER SYMPTOMS
DYSPHORIC MOOD: 1
ABDOMINAL PAIN: 0
SHORTNESS OF BREATH: 0
ARTHRALGIAS: 1
FEVER: 0
FATIGUE: 0

## 2024-05-20 ASSESSMENT — COLUMBIA-SUICIDE SEVERITY RATING SCALE - C-SSRS
2. HAVE YOU ACTUALLY HAD ANY THOUGHTS OF KILLING YOURSELF?: NO
1. IN THE PAST MONTH, HAVE YOU WISHED YOU WERE DEAD OR WISHED YOU COULD GO TO SLEEP AND NOT WAKE UP?: NO
6. HAVE YOU EVER DONE ANYTHING, STARTED TO DO ANYTHING, OR PREPARED TO DO ANYTHING TO END YOUR LIFE?: NO

## 2024-05-20 ASSESSMENT — PATIENT HEALTH QUESTIONNAIRE - PHQ9
2. FEELING DOWN, DEPRESSED OR HOPELESS: NOT AT ALL
1. LITTLE INTEREST OR PLEASURE IN DOING THINGS: NOT AT ALL
SUM OF ALL RESPONSES TO PHQ9 QUESTIONS 1 AND 2: 0

## 2024-05-20 ASSESSMENT — PAIN SCALES - GENERAL: PAINLEVEL: 2

## 2024-05-20 NOTE — HH CARE COORDINATION
Home Care received a Referral for Physical Therapy. We have processed the referral for a Start of Care on 05/22.     If you have any questions or concerns, please feel free to contact us at [phone]. Follow the prompts, enter your five digit zip code, and you will be directed to your care team on [facility].

## 2024-05-20 NOTE — PROGRESS NOTES
Subjective   Patient ID: Grace Velazquez is a 74 y.o. female who presents for Establish Care (Pt is here to establish care, no new concerns /nr).    HPI   Hx ARF + PE 2/2 to COVID (11/2022), lung nodules, idiopathic sleep related nonobstructive alveolar hypoventilation syndrome: follows with pulmonary medicine (Dr. Doss). She was instructed to continue supplemental 2-3L O2 NC at bedtime, but does not need any supplemental O2 during the day.     HTN - controlled on 25mg hydrochlorothiazide. States at the time she started it, it was for edema. She's unsure if it is making a difference for her BP as she does not notice any increase in urination w/the medication. She would like to stay on it though.    OAB - previously followed with UroGyn. S/p cystoscopy + intradetrusor botox INJ. Currently managed w/50mg myrbetriq which is keeping her sxs at bay.    Lymphedema, lipoedema: was told in the past she has lymphedema of lower legs and lipoedema of lower legs. wears compression wraps, elevating LE. Has completed PT, but feels like sxs are worsening. She is feeling more isolated, with limited ADLs. Worried about going places. Uses a rollator walker.     Rosacea - controlled w/metronidazole. Sees dermatology    Intertrigo - controlled w/ketoconazole PRN, sees dermatology    Mentions some nodules on her DIP joints w/occasional pain. Has never been tested for RA.      Review of Systems   Constitutional:  Negative for fatigue and fever.   Respiratory:  Negative for shortness of breath.    Cardiovascular:  Positive for leg swelling. Negative for chest pain.   Gastrointestinal:  Negative for abdominal pain.   Musculoskeletal:  Positive for arthralgias.   Skin:  Negative for rash.   Psychiatric/Behavioral:  Positive for dysphoric mood.        Objective   /62   Pulse 81   Wt (!) 151 kg (332 lb 12.8 oz)   SpO2 97%   BMI 60.47 kg/m²     Physical Exam  Constitutional:       Appearance: Normal appearance.   HENT:      Head:  Normocephalic and atraumatic.   Eyes:      Extraocular Movements: Extraocular movements intact.      Conjunctiva/sclera: Conjunctivae normal.   Cardiovascular:      Rate and Rhythm: Normal rate and regular rhythm.      Heart sounds: Normal heart sounds.   Pulmonary:      Effort: Pulmonary effort is normal.      Breath sounds: Normal breath sounds. No wheezing or rhonchi.   Musculoskeletal:      Cervical back: Normal range of motion and neck supple.      Right lower leg: Edema present.      Left lower leg: Edema present.      Comments: DIP joints R 2nd + 3rd finger w/palpable nodules   Skin:     General: Skin is warm.      Findings: No rash.   Neurological:      General: No focal deficit present.      Mental Status: She is alert.         Assessment/Plan   Problem List Items Addressed This Visit             ICD-10-CM    Essential hypertension - Primary I10    Lipoedema R60.9    Relevant Orders    Referral to Home Care    Lymphedema I89.0    Relevant Orders    Referral to Home Care     Other Visit Diagnoses         Codes    Arthralgia of both hands     M25.541, M25.542    Relevant Orders    Rheumatoid factor    BRITTANY    Sedimentation Rate    C-reactive protein    Citrulline Antibody, IgG    Rosacea     L71.9    Intertrigo     L30.4

## 2024-05-29 ENCOUNTER — HOME CARE VISIT (OUTPATIENT)
Dept: HOME HEALTH SERVICES | Facility: HOME HEALTH | Age: 74
End: 2024-05-29
Payer: MEDICARE

## 2024-05-29 VITALS
HEART RATE: 56 BPM | SYSTOLIC BLOOD PRESSURE: 128 MMHG | TEMPERATURE: 97.6 F | OXYGEN SATURATION: 98 % | DIASTOLIC BLOOD PRESSURE: 62 MMHG | RESPIRATION RATE: 18 BRPM

## 2024-05-29 PROCEDURE — 169592 NO-PAY CLAIM PROCEDURE

## 2024-05-29 PROCEDURE — 1090000002 HH PPS REVENUE DEBIT

## 2024-05-29 PROCEDURE — 0023 HH SOC

## 2024-05-29 PROCEDURE — G0151 HHCP-SERV OF PT,EA 15 MIN: HCPCS | Mod: HHH

## 2024-05-29 PROCEDURE — 1090000001 HH PPS REVENUE CREDIT

## 2024-05-29 SDOH — HEALTH STABILITY: PHYSICAL HEALTH: EXERCISE ACTIVITY: APS, MARCHING, LAQ, HIP ABD/ADD

## 2024-05-29 SDOH — HEALTH STABILITY: PHYSICAL HEALTH: PHYSICAL EXERCISE: SITTING

## 2024-05-29 SDOH — HEALTH STABILITY: MENTAL HEALTH: SMOKING IN HOME: 0

## 2024-05-29 SDOH — HEALTH STABILITY: PHYSICAL HEALTH: EXERCISE ACTIVITIES SETS: 1

## 2024-05-29 SDOH — ECONOMIC STABILITY: HOUSING INSECURITY: EVIDENCE OF SMOKING MATERIAL: 0

## 2024-05-29 SDOH — HEALTH STABILITY: PHYSICAL HEALTH

## 2024-05-29 SDOH — HEALTH STABILITY: PHYSICAL HEALTH: PHYSICAL EXERCISE: 10

## 2024-05-29 ASSESSMENT — ENCOUNTER SYMPTOMS
LIMITED RANGE OF MOTION: 1
MUSCLE WEAKNESS: 1
PAIN LOCATION: LEFT KNEE
PERSON REPORTING PAIN: PATIENT
PAIN LOCATION - PAIN SEVERITY: 4/10
PAIN LOCATION - RELIEVING FACTORS: REST
PAIN LOCATION - EXACERBATING FACTORS: WALKING
PAIN: 1
PAIN LOCATION - PAIN QUALITY: ACHING
PAIN LOCATION - RELIEVING FACTORS: REST
SUBJECTIVE PAIN PROGRESSION: UNCHANGED
LOWEST PAIN SEVERITY IN PAST 24 HOURS: 0/10
PAIN LOCATION - EXACERBATING FACTORS: WALKING
PAIN LOCATION - PAIN SEVERITY: 4/10
PAIN LOCATION - PAIN QUALITY: ACHING
HIGHEST PAIN SEVERITY IN PAST 24 HOURS: 4/10
PAIN LOCATION: RIGHT KNEE
OCCASIONAL FEELINGS OF UNSTEADINESS: 1

## 2024-05-29 ASSESSMENT — ACTIVITIES OF DAILY LIVING (ADL)
PHYSICAL TRANSFERS ASSESSED: 1
OASIS_M1830: 03
AMBULATION_DISTANCE/DURATION_TOLERATED: 50 FEET
CURRENT_FUNCTION: ONE PERSON
ENTERING_EXITING_HOME: CONTACT GUARD ASSIST
AMBULATION ASSISTANCE ON FLAT SURFACES: 1

## 2024-05-30 ENCOUNTER — HOME CARE VISIT (OUTPATIENT)
Dept: HOME HEALTH SERVICES | Facility: HOME HEALTH | Age: 74
End: 2024-05-30
Payer: MEDICARE

## 2024-05-30 PROCEDURE — 1090000001 HH PPS REVENUE CREDIT

## 2024-05-30 PROCEDURE — 1090000002 HH PPS REVENUE DEBIT

## 2024-05-31 ENCOUNTER — LAB (OUTPATIENT)
Dept: LAB | Facility: LAB | Age: 74
End: 2024-05-31
Payer: MEDICARE

## 2024-05-31 DIAGNOSIS — M25.542 ARTHRALGIA OF BOTH HANDS: ICD-10-CM

## 2024-05-31 DIAGNOSIS — M25.541 ARTHRALGIA OF BOTH HANDS: ICD-10-CM

## 2024-05-31 LAB
CCP IGG SERPL-ACNC: <1 U/ML
CRP SERPL-MCNC: 0.16 MG/DL
ERYTHROCYTE [SEDIMENTATION RATE] IN BLOOD BY WESTERGREN METHOD: 50 MM/H (ref 0–30)
RHEUMATOID FACT SER NEPH-ACNC: <10 IU/ML (ref 0–15)

## 2024-05-31 PROCEDURE — 1090000001 HH PPS REVENUE CREDIT

## 2024-05-31 PROCEDURE — 36415 COLL VENOUS BLD VENIPUNCTURE: CPT

## 2024-05-31 PROCEDURE — 1090000002 HH PPS REVENUE DEBIT

## 2024-05-31 PROCEDURE — 85652 RBC SED RATE AUTOMATED: CPT

## 2024-05-31 PROCEDURE — 86200 CCP ANTIBODY: CPT

## 2024-05-31 PROCEDURE — 86431 RHEUMATOID FACTOR QUANT: CPT

## 2024-05-31 PROCEDURE — 86140 C-REACTIVE PROTEIN: CPT

## 2024-05-31 PROCEDURE — 86038 ANTINUCLEAR ANTIBODIES: CPT

## 2024-06-01 PROCEDURE — 1090000001 HH PPS REVENUE CREDIT

## 2024-06-01 PROCEDURE — 1090000002 HH PPS REVENUE DEBIT

## 2024-06-02 PROCEDURE — 1090000001 HH PPS REVENUE CREDIT

## 2024-06-02 PROCEDURE — 1090000002 HH PPS REVENUE DEBIT

## 2024-06-03 ENCOUNTER — HOME CARE VISIT (OUTPATIENT)
Dept: HOME HEALTH SERVICES | Facility: HOME HEALTH | Age: 74
End: 2024-06-03
Payer: MEDICARE

## 2024-06-03 VITALS
DIASTOLIC BLOOD PRESSURE: 62 MMHG | SYSTOLIC BLOOD PRESSURE: 124 MMHG | HEART RATE: 76 BPM | TEMPERATURE: 97.5 F | RESPIRATION RATE: 18 BRPM | OXYGEN SATURATION: 87 %

## 2024-06-03 PROCEDURE — G0151 HHCP-SERV OF PT,EA 15 MIN: HCPCS | Mod: HHH

## 2024-06-03 PROCEDURE — 1090000001 HH PPS REVENUE CREDIT

## 2024-06-03 PROCEDURE — 1090000002 HH PPS REVENUE DEBIT

## 2024-06-03 SDOH — HEALTH STABILITY: PHYSICAL HEALTH: EXERCISE ACTIVITY: CALF RAISES, HIP FLEX/EXT/ABD

## 2024-06-03 SDOH — HEALTH STABILITY: PHYSICAL HEALTH: EXERCISE ACTIVITY: APS, LAQ, HIP MARCHING, HEEL SLIDE, HIP ABD/ADD

## 2024-06-03 SDOH — HEALTH STABILITY: PHYSICAL HEALTH

## 2024-06-03 SDOH — HEALTH STABILITY: PHYSICAL HEALTH: PHYSICAL EXERCISE: 5

## 2024-06-03 SDOH — HEALTH STABILITY: PHYSICAL HEALTH: EXERCISE ACTIVITIES SETS: 1

## 2024-06-03 SDOH — HEALTH STABILITY: PHYSICAL HEALTH: PHYSICAL EXERCISE: 10

## 2024-06-03 SDOH — HEALTH STABILITY: PHYSICAL HEALTH: PHYSICAL EXERCISE: SITTING

## 2024-06-03 SDOH — HEALTH STABILITY: PHYSICAL HEALTH: PHYSICAL EXERCISE: STANDING

## 2024-06-03 ASSESSMENT — ENCOUNTER SYMPTOMS
PAIN: 1
PAIN LOCATION - EXACERBATING FACTORS: STANDING, MOVING
PAIN LOCATION - EXACERBATING FACTORS: STANDING, MOVING
LOWEST PAIN SEVERITY IN PAST 24 HOURS: 0/10
PAIN LOCATION - PAIN SEVERITY: 4/10
PAIN LOCATION - PAIN SEVERITY: 4/10
PAIN LOCATION - PAIN QUALITY: ACHING
PAIN LOCATION - RELIEVING FACTORS: REST
PAIN LOCATION: LEFT KNEE
PAIN LOCATION - PAIN QUALITY: ACHING
SUBJECTIVE PAIN PROGRESSION: UNCHANGED
PAIN LOCATION: RIGHT KNEE
HIGHEST PAIN SEVERITY IN PAST 24 HOURS: 4/10
PAIN LOCATION - RELIEVING FACTORS: REST,
PERSON REPORTING PAIN: PATIENT

## 2024-06-04 LAB — ANA SER QL HEP2 SUBST: NEGATIVE

## 2024-06-04 PROCEDURE — G0180 MD CERTIFICATION HHA PATIENT: HCPCS | Performed by: PHYSICIAN ASSISTANT

## 2024-06-04 PROCEDURE — 1090000002 HH PPS REVENUE DEBIT

## 2024-06-04 PROCEDURE — 1090000001 HH PPS REVENUE CREDIT

## 2024-06-05 PROCEDURE — 1090000002 HH PPS REVENUE DEBIT

## 2024-06-05 PROCEDURE — 1090000001 HH PPS REVENUE CREDIT

## 2024-06-06 ENCOUNTER — HOME CARE VISIT (OUTPATIENT)
Dept: HOME HEALTH SERVICES | Facility: HOME HEALTH | Age: 74
End: 2024-06-06
Payer: MEDICARE

## 2024-06-06 VITALS
DIASTOLIC BLOOD PRESSURE: 72 MMHG | HEART RATE: 77 BPM | TEMPERATURE: 97.5 F | SYSTOLIC BLOOD PRESSURE: 126 MMHG | RESPIRATION RATE: 18 BRPM | OXYGEN SATURATION: 97 %

## 2024-06-06 PROCEDURE — 1090000001 HH PPS REVENUE CREDIT

## 2024-06-06 PROCEDURE — 1090000002 HH PPS REVENUE DEBIT

## 2024-06-06 PROCEDURE — G0151 HHCP-SERV OF PT,EA 15 MIN: HCPCS | Mod: HHH

## 2024-06-06 SDOH — HEALTH STABILITY: PHYSICAL HEALTH: PHYSICAL EXERCISE: 5

## 2024-06-06 SDOH — HEALTH STABILITY: PHYSICAL HEALTH: EXERCISE ACTIVITIES SETS: 1

## 2024-06-06 SDOH — HEALTH STABILITY: PHYSICAL HEALTH: PHYSICAL EXERCISE: STANDING

## 2024-06-06 SDOH — HEALTH STABILITY: PHYSICAL HEALTH: EXERCISE COMMENTS: CUES FOR IMPROVING MUSCLE ISOLATION AND QUALITY OF REPS AS OPPOSED TO QUANTITY WITH POOR FORM.

## 2024-06-06 SDOH — HEALTH STABILITY: PHYSICAL HEALTH

## 2024-06-06 SDOH — HEALTH STABILITY: PHYSICAL HEALTH: EXERCISE ACTIVITY: CALF RAISES, HIP ABD/FLEX/EXT

## 2024-06-06 SDOH — HEALTH STABILITY: PHYSICAL HEALTH: PHYSICAL EXERCISE: SITTING

## 2024-06-06 SDOH — HEALTH STABILITY: PHYSICAL HEALTH: EXERCISE ACTIVITY: SIT TO STAND FROM LIFT CHAIR

## 2024-06-06 SDOH — HEALTH STABILITY: PHYSICAL HEALTH: EXERCISE ACTIVITY: APS, MARCHING, LAQ, HIP ABD/ADD

## 2024-06-06 ASSESSMENT — ENCOUNTER SYMPTOMS
PAIN: 1
PAIN LOCATION - EXACERBATING FACTORS: POST EXERCISE
PAIN LOCATION - PAIN SEVERITY: 7/10
PAIN LOCATION - RELIEVING FACTORS: REST
PAIN LOCATION: LEFT KNEE
HIGHEST PAIN SEVERITY IN PAST 24 HOURS: 7/10
PAIN LOCATION - PAIN QUALITY: SORE
PAIN LOCATION - PAIN QUALITY: SORE
PAIN LOCATION - PAIN SEVERITY: 7/10
LOWEST PAIN SEVERITY IN PAST 24 HOURS: 5/10
PAIN LOCATION - RELIEVING FACTORS: REST
PAIN LOCATION - EXACERBATING FACTORS: POST EXERCISE
PAIN LOCATION: RIGHT KNEE

## 2024-06-07 PROCEDURE — 1090000002 HH PPS REVENUE DEBIT

## 2024-06-07 PROCEDURE — 1090000001 HH PPS REVENUE CREDIT

## 2024-06-08 PROCEDURE — 1090000001 HH PPS REVENUE CREDIT

## 2024-06-08 PROCEDURE — 1090000002 HH PPS REVENUE DEBIT

## 2024-06-09 PROCEDURE — 1090000002 HH PPS REVENUE DEBIT

## 2024-06-09 PROCEDURE — 1090000001 HH PPS REVENUE CREDIT

## 2024-06-10 ENCOUNTER — HOME CARE VISIT (OUTPATIENT)
Dept: HOME HEALTH SERVICES | Facility: HOME HEALTH | Age: 74
End: 2024-06-10
Payer: MEDICARE

## 2024-06-10 ENCOUNTER — PATIENT MESSAGE (OUTPATIENT)
Dept: PRIMARY CARE | Facility: CLINIC | Age: 74
End: 2024-06-10
Payer: MEDICARE

## 2024-06-10 VITALS
SYSTOLIC BLOOD PRESSURE: 128 MMHG | OXYGEN SATURATION: 96 % | DIASTOLIC BLOOD PRESSURE: 66 MMHG | RESPIRATION RATE: 18 BRPM | TEMPERATURE: 96.9 F | HEART RATE: 82 BPM

## 2024-06-10 PROCEDURE — 1090000001 HH PPS REVENUE CREDIT

## 2024-06-10 PROCEDURE — 1090000002 HH PPS REVENUE DEBIT

## 2024-06-10 PROCEDURE — G0151 HHCP-SERV OF PT,EA 15 MIN: HCPCS | Mod: HHH

## 2024-06-10 SDOH — HEALTH STABILITY: PHYSICAL HEALTH: PHYSICAL EXERCISE: SITTING

## 2024-06-10 SDOH — HEALTH STABILITY: PHYSICAL HEALTH: EXERCISE ACTIVITY: APS, MARCHING, LAQ, HIP ABD/ADD

## 2024-06-10 SDOH — HEALTH STABILITY: PHYSICAL HEALTH: EXERCISE ACTIVITIES SETS: 1

## 2024-06-10 SDOH — HEALTH STABILITY: PHYSICAL HEALTH: PHYSICAL EXERCISE: 10

## 2024-06-10 SDOH — HEALTH STABILITY: PHYSICAL HEALTH

## 2024-06-10 SDOH — HEALTH STABILITY: MENTAL HEALTH: SMOKING IN HOME: 0

## 2024-06-10 SDOH — HEALTH STABILITY: PHYSICAL HEALTH: EXERCISE ACTIVITY: CALF RAISES, HIP FLEX/EXT/ABD, MINI MARCHES,

## 2024-06-10 SDOH — HEALTH STABILITY: PHYSICAL HEALTH: PHYSICAL EXERCISE: STANDING

## 2024-06-10 SDOH — ECONOMIC STABILITY: HOUSING INSECURITY: EVIDENCE OF SMOKING MATERIAL: 0

## 2024-06-10 SDOH — HEALTH STABILITY: PHYSICAL HEALTH: EXERCISE ACTIVITY: SIT TO STAND

## 2024-06-10 ASSESSMENT — ENCOUNTER SYMPTOMS
PAIN LOCATION: LEFT KNEE
PAIN LOCATION - EXACERBATING FACTORS: MOVEMENT
PAIN LOCATION - PAIN SEVERITY: 5/10
PAIN LOCATION - RELIEVING FACTORS: REST, ICE
SUBJECTIVE PAIN PROGRESSION: GRADUALLY IMPROVING
PAIN LOCATION - RELIEVING FACTORS: REST, ICE
PAIN LOCATION - PAIN QUALITY: ACHING
HIGHEST PAIN SEVERITY IN PAST 24 HOURS: 7/10
LOWEST PAIN SEVERITY IN PAST 24 HOURS: 5/10
PAIN LOCATION - EXACERBATING FACTORS: MOVEMENT
PAIN: 1
PAIN LOCATION - PAIN SEVERITY: 5/10
PAIN LOCATION: RIGHT KNEE
PERSON REPORTING PAIN: PATIENT
PAIN LOCATION - PAIN QUALITY: ACHING

## 2024-06-13 ENCOUNTER — HOME CARE VISIT (OUTPATIENT)
Dept: HOME HEALTH SERVICES | Facility: HOME HEALTH | Age: 74
End: 2024-06-13
Payer: MEDICARE

## 2024-06-13 VITALS
SYSTOLIC BLOOD PRESSURE: 128 MMHG | TEMPERATURE: 97.6 F | DIASTOLIC BLOOD PRESSURE: 68 MMHG | HEART RATE: 76 BPM | RESPIRATION RATE: 18 BRPM | OXYGEN SATURATION: 98 %

## 2024-06-13 PROCEDURE — G0151 HHCP-SERV OF PT,EA 15 MIN: HCPCS | Mod: HHH

## 2024-06-13 SDOH — HEALTH STABILITY: PHYSICAL HEALTH

## 2024-06-13 SDOH — HEALTH STABILITY: PHYSICAL HEALTH: PHYSICAL EXERCISE: 10

## 2024-06-13 SDOH — HEALTH STABILITY: PHYSICAL HEALTH: EXERCISE ACTIVITIES SETS: 1

## 2024-06-13 SDOH — HEALTH STABILITY: PHYSICAL HEALTH: EXERCISE ACTIVITY: SIT TO STAND

## 2024-06-13 SDOH — HEALTH STABILITY: PHYSICAL HEALTH: PHYSICAL EXERCISE: STANDING

## 2024-06-13 SDOH — HEALTH STABILITY: PHYSICAL HEALTH: EXERCISE ACTIVITY: APS, MARCHING, LAQ, HIP ABD/ADD

## 2024-06-13 SDOH — HEALTH STABILITY: PHYSICAL HEALTH: PHYSICAL EXERCISE: SITTING

## 2024-06-13 SDOH — HEALTH STABILITY: PHYSICAL HEALTH: EXERCISE ACTIVITY: CALF RAISES, HIP FLEX/EXT/ABD,

## 2024-06-13 SDOH — HEALTH STABILITY: PHYSICAL HEALTH: PHYSICAL EXERCISE: 5

## 2024-06-13 ASSESSMENT — ENCOUNTER SYMPTOMS
PAIN LOCATION - RELIEVING FACTORS: ICE
PAIN LOCATION - EXACERBATING FACTORS: POST EXERCISE
LOWEST PAIN SEVERITY IN PAST 24 HOURS: 4/10
HIGHEST PAIN SEVERITY IN PAST 24 HOURS: 5/10
PAIN LOCATION - EXACERBATING FACTORS: POST EXERCISE
PERSON REPORTING PAIN: PATIENT
PAIN LOCATION: RIGHT KNEE
PAIN LOCATION - PAIN SEVERITY: 4/10
PAIN: 1
PAIN LOCATION: LEFT KNEE
PAIN LOCATION - PAIN QUALITY: SORE
PAIN LOCATION - RELIEVING FACTORS: ICE
PAIN LOCATION - PAIN SEVERITY: 4/10
PAIN LOCATION - PAIN QUALITY: SORE

## 2024-06-17 ENCOUNTER — HOME CARE VISIT (OUTPATIENT)
Dept: HOME HEALTH SERVICES | Facility: HOME HEALTH | Age: 74
End: 2024-06-17
Payer: MEDICARE

## 2024-06-17 VITALS
HEART RATE: 76 BPM | SYSTOLIC BLOOD PRESSURE: 128 MMHG | RESPIRATION RATE: 18 BRPM | TEMPERATURE: 97.5 F | DIASTOLIC BLOOD PRESSURE: 70 MMHG | OXYGEN SATURATION: 98 %

## 2024-06-17 PROCEDURE — G0151 HHCP-SERV OF PT,EA 15 MIN: HCPCS | Mod: HHH

## 2024-06-17 SDOH — HEALTH STABILITY: PHYSICAL HEALTH: PHYSICAL EXERCISE: 10

## 2024-06-17 SDOH — HEALTH STABILITY: PHYSICAL HEALTH: PHYSICAL EXERCISE: STANDING

## 2024-06-17 SDOH — HEALTH STABILITY: PHYSICAL HEALTH

## 2024-06-17 SDOH — HEALTH STABILITY: MENTAL HEALTH: SMOKING IN HOME: 0

## 2024-06-17 SDOH — HEALTH STABILITY: PHYSICAL HEALTH: EXERCISE ACTIVITY: CALF RAISES, HIP FLEX/EXT/ABD,

## 2024-06-17 SDOH — HEALTH STABILITY: PHYSICAL HEALTH: EXERCISE ACTIVITY: APS, MARCHING, LAQ, HIP ABD/ADD

## 2024-06-17 SDOH — HEALTH STABILITY: PHYSICAL HEALTH: EXERCISE ACTIVITIES SETS: 1

## 2024-06-17 SDOH — HEALTH STABILITY: PHYSICAL HEALTH: PHYSICAL EXERCISE: SITTING

## 2024-06-17 SDOH — ECONOMIC STABILITY: HOUSING INSECURITY: EVIDENCE OF SMOKING MATERIAL: 0

## 2024-06-17 ASSESSMENT — ENCOUNTER SYMPTOMS
PAIN LOCATION - PAIN SEVERITY: 3/10
PAIN LOCATION: RIGHT KNEE
PAIN LOCATION - PAIN QUALITY: ACHING
PAIN LOCATION - RELIEVING FACTORS: REST
LOWEST PAIN SEVERITY IN PAST 24 HOURS: 3/10
PAIN LOCATION - EXACERBATING FACTORS: MOVEMENT
PAIN LOCATION - PAIN QUALITY: ACHING
PERSON REPORTING PAIN: PATIENT
PAIN: 1
PAIN LOCATION - EXACERBATING FACTORS: MOVEMENT
PAIN LOCATION - RELIEVING FACTORS: REST
PAIN LOCATION - PAIN SEVERITY: 3/10
SUBJECTIVE PAIN PROGRESSION: GRADUALLY IMPROVING
PAIN LOCATION: LEFT KNEE
HIGHEST PAIN SEVERITY IN PAST 24 HOURS: 4/10

## 2024-06-20 ENCOUNTER — HOME CARE VISIT (OUTPATIENT)
Dept: HOME HEALTH SERVICES | Facility: HOME HEALTH | Age: 74
End: 2024-06-20
Payer: MEDICARE

## 2024-06-20 VITALS
SYSTOLIC BLOOD PRESSURE: 126 MMHG | OXYGEN SATURATION: 97 % | TEMPERATURE: 97.3 F | RESPIRATION RATE: 18 BRPM | DIASTOLIC BLOOD PRESSURE: 64 MMHG | HEART RATE: 72 BPM

## 2024-06-20 PROCEDURE — G0151 HHCP-SERV OF PT,EA 15 MIN: HCPCS | Mod: HHH

## 2024-06-20 SDOH — HEALTH STABILITY: PHYSICAL HEALTH

## 2024-06-20 SDOH — HEALTH STABILITY: PHYSICAL HEALTH: EXERCISE ACTIVITIES SETS: 1

## 2024-06-20 SDOH — ECONOMIC STABILITY: HOUSING INSECURITY: EVIDENCE OF SMOKING MATERIAL: 0

## 2024-06-20 SDOH — HEALTH STABILITY: PHYSICAL HEALTH: EXERCISE ACTIVITY: APS, MARCHING, LAQ, HIP ABD/ADD

## 2024-06-20 SDOH — HEALTH STABILITY: PHYSICAL HEALTH: PHYSICAL EXERCISE: 10

## 2024-06-20 SDOH — HEALTH STABILITY: PHYSICAL HEALTH: PHYSICAL EXERCISE: SITTING

## 2024-06-20 SDOH — HEALTH STABILITY: PHYSICAL HEALTH: PHYSICAL EXERCISE: STANDING

## 2024-06-20 SDOH — HEALTH STABILITY: PHYSICAL HEALTH: EXERCISE ACTIVITY: SIT TO STAND

## 2024-06-20 SDOH — HEALTH STABILITY: PHYSICAL HEALTH: EXERCISE ACTIVITY: CALF RAIESES, HIP FLEX/EXT/ABD, MARCHING,

## 2024-06-20 SDOH — HEALTH STABILITY: MENTAL HEALTH: SMOKING IN HOME: 0

## 2024-06-20 ASSESSMENT — ENCOUNTER SYMPTOMS
PAIN LOCATION - PAIN SEVERITY: 5/10
PAIN LOCATION - EXACERBATING FACTORS: MOVEMENT
PERSON REPORTING PAIN: PATIENT
PAIN LOCATION - PAIN QUALITY: ACHING
PAIN LOCATION: RIGHT KNEE
PAIN LOCATION - RELIEVING FACTORS: REST, ICE
PAIN LOCATION - PAIN SEVERITY: 5/10
PAIN: 1
HIGHEST PAIN SEVERITY IN PAST 24 HOURS: 5/10
PAIN LOCATION - RELIEVING FACTORS: REST, ICE
PAIN LOCATION: LEFT KNEE
PAIN LOCATION - EXACERBATING FACTORS: MOVEMENT
PAIN LOCATION - PAIN QUALITY: ACHING
LOWEST PAIN SEVERITY IN PAST 24 HOURS: 5/10
SUBJECTIVE PAIN PROGRESSION: UNCHANGED

## 2024-06-24 ENCOUNTER — HOME CARE VISIT (OUTPATIENT)
Dept: HOME HEALTH SERVICES | Facility: HOME HEALTH | Age: 74
End: 2024-06-24
Payer: MEDICARE

## 2024-06-24 VITALS
HEART RATE: 81 BPM | TEMPERATURE: 97.5 F | SYSTOLIC BLOOD PRESSURE: 126 MMHG | DIASTOLIC BLOOD PRESSURE: 72 MMHG | OXYGEN SATURATION: 98 % | RESPIRATION RATE: 18 BRPM

## 2024-06-24 PROCEDURE — G0151 HHCP-SERV OF PT,EA 15 MIN: HCPCS | Mod: HHH

## 2024-06-24 SDOH — HEALTH STABILITY: PHYSICAL HEALTH: EXERCISE ACTIVITIES SETS: 1

## 2024-06-24 SDOH — HEALTH STABILITY: PHYSICAL HEALTH: EXERCISE ACTIVITY: SIT TO STAND

## 2024-06-24 SDOH — HEALTH STABILITY: PHYSICAL HEALTH: PHYSICAL EXERCISE: SITTING

## 2024-06-24 SDOH — HEALTH STABILITY: PHYSICAL HEALTH

## 2024-06-24 SDOH — HEALTH STABILITY: PHYSICAL HEALTH: PHYSICAL EXERCISE: 10

## 2024-06-24 SDOH — ECONOMIC STABILITY: HOUSING INSECURITY: EVIDENCE OF SMOKING MATERIAL: 0

## 2024-06-24 SDOH — HEALTH STABILITY: PHYSICAL HEALTH: PHYSICAL EXERCISE: STANDING

## 2024-06-24 SDOH — HEALTH STABILITY: MENTAL HEALTH: SMOKING IN HOME: 0

## 2024-06-24 SDOH — HEALTH STABILITY: PHYSICAL HEALTH: EXERCISE ACTIVITY: CALF RAISES, HIP FLEX/EXT/ABD, MARCHING,

## 2024-06-24 SDOH — HEALTH STABILITY: PHYSICAL HEALTH: EXERCISE ACTIVITY: APS, MARCHING, LAQ, HIP ABD/ADD

## 2024-06-24 ASSESSMENT — ENCOUNTER SYMPTOMS
PAIN LOCATION - PAIN SEVERITY: 4/10
PAIN LOCATION - EXACERBATING FACTORS: MOVEMENT
PERSON REPORTING PAIN: PATIENT
PAIN LOCATION - PAIN QUALITY: ACHING
PAIN LOCATION - RELIEVING FACTORS: REST, ICE
HIGHEST PAIN SEVERITY IN PAST 24 HOURS: 5/10
LOWEST PAIN SEVERITY IN PAST 24 HOURS: 4/10
PAIN LOCATION: LEFT KNEE
PAIN LOCATION - EXACERBATING FACTORS: MOVEMENT
PAIN LOCATION - RELIEVING FACTORS: REST, ICE
SUBJECTIVE PAIN PROGRESSION: GRADUALLY IMPROVING
PAIN LOCATION - PAIN SEVERITY: 4/10
PAIN: 1
PAIN LOCATION - PAIN QUALITY: ACHING
PAIN LOCATION: RIGHT KNEE

## 2024-06-27 ENCOUNTER — HOME CARE VISIT (OUTPATIENT)
Dept: HOME HEALTH SERVICES | Facility: HOME HEALTH | Age: 74
End: 2024-06-27
Payer: MEDICARE

## 2024-06-27 VITALS
RESPIRATION RATE: 18 BRPM | OXYGEN SATURATION: 95 % | HEART RATE: 75 BPM | DIASTOLIC BLOOD PRESSURE: 64 MMHG | TEMPERATURE: 97.6 F | SYSTOLIC BLOOD PRESSURE: 128 MMHG

## 2024-06-27 PROCEDURE — G0151 HHCP-SERV OF PT,EA 15 MIN: HCPCS | Mod: HHH

## 2024-06-27 SDOH — ECONOMIC STABILITY: HOUSING INSECURITY: EVIDENCE OF SMOKING MATERIAL: 0

## 2024-06-27 SDOH — HEALTH STABILITY: MENTAL HEALTH: SMOKING IN HOME: 0

## 2024-06-27 ASSESSMENT — ACTIVITIES OF DAILY LIVING (ADL)
OASIS_M1830: 01
AMBULATION ASSISTANCE: INDEPENDENT
PHYSICAL TRANSFERS ASSESSED: 1
AMBULATION ASSISTANCE: 1
CURRENT_FUNCTION: INDEPENDENT
AMBULATION ASSISTANCE ON FLAT SURFACES: 1
HOME_HEALTH_OASIS: 00

## 2024-06-27 ASSESSMENT — ENCOUNTER SYMPTOMS
PAIN LOCATION - EXACERBATING FACTORS: MOVEMENT
PAIN LOCATION - PAIN SEVERITY: 5/10
PAIN LOCATION - EXACERBATING FACTORS: MOVEMENT
PERSON REPORTING PAIN: PATIENT
PAIN LOCATION - RELIEVING FACTORS: REST, ICE
PAIN LOCATION - RELIEVING FACTORS: REST, ICE
PAIN LOCATION: RIGHT KNEE
PAIN: 1
PAIN LOCATION: LEFT KNEE
PAIN LOCATION - PAIN SEVERITY: 5/10

## 2024-08-19 ENCOUNTER — OFFICE VISIT (OUTPATIENT)
Dept: PRIMARY CARE | Facility: CLINIC | Age: 74
End: 2024-08-19
Payer: MEDICARE

## 2024-08-19 VITALS
HEART RATE: 75 BPM | DIASTOLIC BLOOD PRESSURE: 62 MMHG | SYSTOLIC BLOOD PRESSURE: 122 MMHG | BODY MASS INDEX: 60.4 KG/M2 | OXYGEN SATURATION: 99 % | WEIGHT: 293 LBS

## 2024-08-19 DIAGNOSIS — R73.01 IFG (IMPAIRED FASTING GLUCOSE): ICD-10-CM

## 2024-08-19 DIAGNOSIS — Z00.00 ROUTINE GENERAL MEDICAL EXAMINATION AT HEALTH CARE FACILITY: Primary | ICD-10-CM

## 2024-08-19 DIAGNOSIS — Z12.31 ENCOUNTER FOR SCREENING MAMMOGRAM FOR BREAST CANCER: ICD-10-CM

## 2024-08-19 DIAGNOSIS — R70.0 ELEVATED SEDIMENTATION RATE: ICD-10-CM

## 2024-08-19 DIAGNOSIS — I10 ESSENTIAL HYPERTENSION: ICD-10-CM

## 2024-08-19 DIAGNOSIS — Z78.0 ASYMPTOMATIC MENOPAUSAL STATE: ICD-10-CM

## 2024-08-19 PROCEDURE — 1170F FXNL STATUS ASSESSED: CPT | Performed by: PHYSICIAN ASSISTANT

## 2024-08-19 PROCEDURE — 1157F ADVNC CARE PLAN IN RCRD: CPT | Performed by: PHYSICIAN ASSISTANT

## 2024-08-19 PROCEDURE — 1159F MED LIST DOCD IN RCRD: CPT | Performed by: PHYSICIAN ASSISTANT

## 2024-08-19 PROCEDURE — 1036F TOBACCO NON-USER: CPT | Performed by: PHYSICIAN ASSISTANT

## 2024-08-19 PROCEDURE — G0439 PPPS, SUBSEQ VISIT: HCPCS | Performed by: PHYSICIAN ASSISTANT

## 2024-08-19 PROCEDURE — 1160F RVW MEDS BY RX/DR IN RCRD: CPT | Performed by: PHYSICIAN ASSISTANT

## 2024-08-19 PROCEDURE — 1123F ACP DISCUSS/DSCN MKR DOCD: CPT | Performed by: PHYSICIAN ASSISTANT

## 2024-08-19 PROCEDURE — 3074F SYST BP LT 130 MM HG: CPT | Performed by: PHYSICIAN ASSISTANT

## 2024-08-19 PROCEDURE — 99215 OFFICE O/P EST HI 40 MIN: CPT | Performed by: PHYSICIAN ASSISTANT

## 2024-08-19 PROCEDURE — 3078F DIAST BP <80 MM HG: CPT | Performed by: PHYSICIAN ASSISTANT

## 2024-08-19 PROCEDURE — 1158F ADVNC CARE PLAN TLK DOCD: CPT | Performed by: PHYSICIAN ASSISTANT

## 2024-08-19 RX ORDER — LACTOBACILLUS COMBINATION NO.4 3B CELL
CAPSULE ORAL
COMMUNITY

## 2024-08-19 RX ORDER — HYDROCHLOROTHIAZIDE 25 MG/1
25 TABLET ORAL DAILY
Qty: 90 TABLET | Refills: 1 | Status: SHIPPED | OUTPATIENT
Start: 2024-08-19

## 2024-08-19 ASSESSMENT — ACTIVITIES OF DAILY LIVING (ADL)
PILL BOX USED: NO
GROCERY SHOPPING: INDEPENDENT
FEEDING: INDEPENDENT
EATING: INDEPENDENT
TOILETING: INDEPENDENT
USING TELEPHONE: INDEPENDENT
TAKING MEDICATION: INDEPENDENT
STIL DRIVING: NO
BATHING: INDEPENDENT
DRESSING: INDEPENDENT
PREPARING MEALS: INDEPENDENT
MANAGING FINANCES: INDEPENDENT
USING TRANSPORTATION: INDEPENDENT
DOING HOUSEWORK: INDEPENDENT
NEEDS ASSISTANCE WITH FOOD: INDEPENDENT
JUDGMENT_ADEQUATE_SAFELY_COMPLETE_DAILY_ACTIVITIES: YES
ADEQUATE_TO_COMPLETE_ADL: YES

## 2024-08-19 ASSESSMENT — ENCOUNTER SYMPTOMS
BLOOD IN STOOL: 0
ABDOMINAL PAIN: 0
DIZZINESS: 0
LIGHT-HEADEDNESS: 0
SHORTNESS OF BREATH: 0

## 2024-08-19 ASSESSMENT — COGNITIVE AND FUNCTIONAL STATUS - GENERAL
TRAIL MAKING TEST: PATIENT COMPLETES TRAIL MAKING TEST PROPERLY.
VERBAL FLUENCY - ANIMAL NAMES (0 TO 25): 3

## 2024-08-19 ASSESSMENT — COLUMBIA-SUICIDE SEVERITY RATING SCALE - C-SSRS
6. HAVE YOU EVER DONE ANYTHING, STARTED TO DO ANYTHING, OR PREPARED TO DO ANYTHING TO END YOUR LIFE?: NO
1. IN THE PAST MONTH, HAVE YOU WISHED YOU WERE DEAD OR WISHED YOU COULD GO TO SLEEP AND NOT WAKE UP?: NO
2. HAVE YOU ACTUALLY HAD ANY THOUGHTS OF KILLING YOURSELF?: NO

## 2024-08-19 NOTE — PROGRESS NOTES
Subjective   Patient ID: Grace Velazquez is a 74 y.o. female who presents for Annual Exam (Pt is here for medicare physical / nr) and Ear Fullness (Pt would like to have her right ear check, due to feeling clogged /nr).    HPI   Active Medical Problems: hx ARF + PE 2/2 Covid (11/2022), lung nodules, idiopathic sleep related nonobstructive alveolar hypoventilation syndrome (managed by Dr. Doss) - supplemental 2-3L O2 at bedtime, HTN, OAB - s/p cystoscopy + intradetrusor botox INJ, discontinued myrbetriq - sxs stable,  rosacea, intertrigo, lymphedema/lipoedema  Other Providers: home care PT, Dr. Doss  PMH/PSH/FMH/SHX: Reviewed  Advanced Directives: Discussed  Hearing changes: Denies  Depression: Denies  Functional Ability/Safety: Get up and go test <30s. Denies issues with ADLs. No home safety concerns. Pt denies falls  Cognitive Eval: See MMSE  Preventative Services: due for mammogram if she still wants, colonoscopy 9/2021    R ear pain - c/o pressure, decreased hearing. Worst in the evening. Has never had cerumen buildup requiring curette/flushing.     Review of Systems   HENT:  Ear pain: ear pressure.    Respiratory:  Negative for shortness of breath.    Cardiovascular:  Positive for leg swelling. Negative for chest pain.   Gastrointestinal:  Negative for abdominal pain and blood in stool.   Genitourinary:  Negative for vaginal bleeding.   Skin:  Negative for rash.   Neurological:  Negative for dizziness and light-headedness.       Objective   /62   Pulse 75   Wt (!) 151 kg (332 lb 6.4 oz)   SpO2 99%   BMI 60.40 kg/m²     Physical Exam  Vitals reviewed.   Constitutional:       Appearance: Normal appearance.   HENT:      Head: Normocephalic and atraumatic.      Right Ear: Ear canal normal.      Left Ear: Tympanic membrane and ear canal normal.      Ears:      Comments: R TM w/serous effusion     Nose: Nose normal.      Mouth/Throat:      Mouth: Mucous membranes are moist.      Pharynx: No oropharyngeal  exudate.   Eyes:      Extraocular Movements: Extraocular movements intact.      Conjunctiva/sclera: Conjunctivae normal.      Pupils: Pupils are equal, round, and reactive to light.   Cardiovascular:      Rate and Rhythm: Normal rate and regular rhythm.      Heart sounds: Normal heart sounds.   Pulmonary:      Effort: Pulmonary effort is normal.      Breath sounds: Normal breath sounds. No wheezing.   Abdominal:      General: There is no distension.      Palpations: Abdomen is soft.      Tenderness: There is no abdominal tenderness.   Musculoskeletal:         General: No tenderness.      Cervical back: Normal range of motion and neck supple.      Right lower leg: Edema present.      Left lower leg: Edema present.   Skin:     General: Skin is warm and dry.      Findings: No rash.   Neurological:      General: No focal deficit present.      Mental Status: She is alert. Mental status is at baseline.   Psychiatric:         Mood and Affect: Mood normal.         Assessment/Plan   Problem List Items Addressed This Visit             ICD-10-CM    Essential hypertension I10    Relevant Medications    hydroCHLOROthiazide (HYDRODiuril) 25 mg tablet    Routine general medical examination at health care facility - Primary Z00.00     Other Visit Diagnoses         Codes    Asymptomatic menopausal state     Z78.0    Relevant Orders    XR DEXA bone density    Encounter for screening mammogram for breast cancer     Z12.31    Relevant Orders    BI mammo bilateral screening tomosynthesis    IFG (impaired fasting glucose)     R73.01    Relevant Orders    Comprehensive Metabolic Panel    Hemoglobin A1C    Elevated sedimentation rate     R70.0    Relevant Orders    Sedimentation Rate          Recommend flonase for serous effusion. FU 6mos for routine FU, sooner as needed.

## 2024-08-21 ENCOUNTER — LAB (OUTPATIENT)
Dept: LAB | Facility: LAB | Age: 74
End: 2024-08-21
Payer: MEDICARE

## 2024-08-21 DIAGNOSIS — R73.01 IFG (IMPAIRED FASTING GLUCOSE): ICD-10-CM

## 2024-08-21 DIAGNOSIS — R70.0 ELEVATED SEDIMENTATION RATE: ICD-10-CM

## 2024-08-21 PROCEDURE — 83036 HEMOGLOBIN GLYCOSYLATED A1C: CPT

## 2024-08-21 PROCEDURE — 85652 RBC SED RATE AUTOMATED: CPT

## 2024-08-21 PROCEDURE — 36415 COLL VENOUS BLD VENIPUNCTURE: CPT

## 2024-08-21 PROCEDURE — 80053 COMPREHEN METABOLIC PANEL: CPT

## 2024-08-22 ENCOUNTER — TELEPHONE (OUTPATIENT)
Dept: PRIMARY CARE | Facility: CLINIC | Age: 74
End: 2024-08-22
Payer: MEDICARE

## 2024-08-22 ENCOUNTER — HOSPITAL ENCOUNTER (OUTPATIENT)
Dept: RADIOLOGY | Facility: CLINIC | Age: 74
Discharge: HOME | End: 2024-08-22
Payer: MEDICARE

## 2024-08-22 DIAGNOSIS — M25.542 ARTHRALGIA OF BOTH HANDS: Primary | ICD-10-CM

## 2024-08-22 DIAGNOSIS — R73.01 IFG (IMPAIRED FASTING GLUCOSE): ICD-10-CM

## 2024-08-22 DIAGNOSIS — Z12.31 ENCOUNTER FOR SCREENING MAMMOGRAM FOR BREAST CANCER: ICD-10-CM

## 2024-08-22 DIAGNOSIS — E78.2 MIXED HYPERLIPIDEMIA: ICD-10-CM

## 2024-08-22 DIAGNOSIS — M25.541 ARTHRALGIA OF BOTH HANDS: Primary | ICD-10-CM

## 2024-08-22 DIAGNOSIS — E55.9 VITAMIN D DEFICIENCY: ICD-10-CM

## 2024-08-22 DIAGNOSIS — I10 ESSENTIAL HYPERTENSION: ICD-10-CM

## 2024-08-22 DIAGNOSIS — R70.0 ELEVATED SEDIMENTATION RATE: ICD-10-CM

## 2024-08-22 LAB
ALBUMIN SERPL BCP-MCNC: 4.1 G/DL (ref 3.4–5)
ALP SERPL-CCNC: 88 U/L (ref 33–136)
ALT SERPL W P-5'-P-CCNC: 10 U/L (ref 7–45)
ANION GAP SERPL CALC-SCNC: 18 MMOL/L (ref 10–20)
AST SERPL W P-5'-P-CCNC: 14 U/L (ref 9–39)
BILIRUB SERPL-MCNC: 0.8 MG/DL (ref 0–1.2)
BUN SERPL-MCNC: 19 MG/DL (ref 6–23)
CALCIUM SERPL-MCNC: 9.5 MG/DL (ref 8.6–10.6)
CHLORIDE SERPL-SCNC: 103 MMOL/L (ref 98–107)
CO2 SERPL-SCNC: 26 MMOL/L (ref 21–32)
CREAT SERPL-MCNC: 0.82 MG/DL (ref 0.5–1.05)
EGFRCR SERPLBLD CKD-EPI 2021: 74 ML/MIN/1.73M*2
ERYTHROCYTE [SEDIMENTATION RATE] IN BLOOD BY WESTERGREN METHOD: 33 MM/H (ref 0–30)
EST. AVERAGE GLUCOSE BLD GHB EST-MCNC: 103 MG/DL
GLUCOSE SERPL-MCNC: 91 MG/DL (ref 74–99)
HBA1C MFR BLD: 5.2 %
POTASSIUM SERPL-SCNC: 4.2 MMOL/L (ref 3.5–5.3)
PROT SERPL-MCNC: 7.4 G/DL (ref 6.4–8.2)
SODIUM SERPL-SCNC: 143 MMOL/L (ref 136–145)

## 2024-08-22 NOTE — TELEPHONE ENCOUNTER
----- Message from Aster Guerrero sent at 8/22/2024  8:33 AM EDT -----  Sugar is stable, A1c 5.2 - same as previous reading. GFR which is a measurement of kidney function is slightly lower but this can be due to dehydration. Nothing to worry about at this time, we will repeat testing in 6 months. Her sedimentation rate is still slightly elevated (though better than last time). I still placed a referral to rheumatology for her just to be sure.     I've also placed lab orders for 6 months from now so she can get them done 1 week before her follow up visit in February.

## 2024-09-26 ENCOUNTER — HOSPITAL ENCOUNTER (OUTPATIENT)
Dept: RADIOLOGY | Facility: CLINIC | Age: 74
Discharge: HOME | End: 2024-09-26
Payer: MEDICARE

## 2024-09-26 VITALS — WEIGHT: 293 LBS | HEIGHT: 65 IN | BODY MASS INDEX: 48.82 KG/M2

## 2024-09-26 PROCEDURE — 77067 SCR MAMMO BI INCL CAD: CPT

## 2024-12-02 ENCOUNTER — APPOINTMENT (OUTPATIENT)
Dept: ORTHOPEDIC SURGERY | Facility: CLINIC | Age: 74
End: 2024-12-02
Payer: MEDICARE

## 2024-12-09 ENCOUNTER — HOSPITAL ENCOUNTER (OUTPATIENT)
Dept: RADIOLOGY | Facility: HOSPITAL | Age: 74
Discharge: HOME | End: 2024-12-09
Payer: MEDICARE

## 2024-12-09 ENCOUNTER — OFFICE VISIT (OUTPATIENT)
Dept: ORTHOPEDIC SURGERY | Facility: HOSPITAL | Age: 74
End: 2024-12-09
Payer: MEDICARE

## 2024-12-09 DIAGNOSIS — G89.29 BILATERAL CHRONIC KNEE PAIN: ICD-10-CM

## 2024-12-09 DIAGNOSIS — M25.561 BILATERAL CHRONIC KNEE PAIN: ICD-10-CM

## 2024-12-09 DIAGNOSIS — M79.644 PAIN OF RIGHT MIDDLE FINGER: ICD-10-CM

## 2024-12-09 DIAGNOSIS — M25.562 BILATERAL CHRONIC KNEE PAIN: ICD-10-CM

## 2024-12-09 DIAGNOSIS — M79.644 PAIN OF RIGHT MIDDLE FINGER: Primary | ICD-10-CM

## 2024-12-09 DIAGNOSIS — M10.9 GOUTY ARTHRITIS OF RIGHT HAND: ICD-10-CM

## 2024-12-09 PROCEDURE — 99203 OFFICE O/P NEW LOW 30 MIN: CPT | Performed by: STUDENT IN AN ORGANIZED HEALTH CARE EDUCATION/TRAINING PROGRAM

## 2024-12-09 PROCEDURE — 73130 X-RAY EXAM OF HAND: CPT | Mod: RT

## 2024-12-09 PROCEDURE — 1159F MED LIST DOCD IN RCRD: CPT | Performed by: STUDENT IN AN ORGANIZED HEALTH CARE EDUCATION/TRAINING PROGRAM

## 2024-12-09 PROCEDURE — 1157F ADVNC CARE PLAN IN RCRD: CPT | Performed by: STUDENT IN AN ORGANIZED HEALTH CARE EDUCATION/TRAINING PROGRAM

## 2024-12-09 PROCEDURE — 73130 X-RAY EXAM OF HAND: CPT | Mod: RIGHT SIDE | Performed by: RADIOLOGY

## 2024-12-09 PROCEDURE — 1123F ACP DISCUSS/DSCN MKR DOCD: CPT | Performed by: STUDENT IN AN ORGANIZED HEALTH CARE EDUCATION/TRAINING PROGRAM

## 2024-12-09 PROCEDURE — 99213 OFFICE O/P EST LOW 20 MIN: CPT | Performed by: STUDENT IN AN ORGANIZED HEALTH CARE EDUCATION/TRAINING PROGRAM

## 2024-12-09 RX ORDER — METHYLPREDNISOLONE 4 MG/1
4 TABLET ORAL ONCE
Qty: 21 TABLET | Refills: 0 | Status: SHIPPED | OUTPATIENT
Start: 2024-12-09 | End: 2024-12-09

## 2024-12-09 ASSESSMENT — PAIN - FUNCTIONAL ASSESSMENT: PAIN_FUNCTIONAL_ASSESSMENT: NO/DENIES PAIN

## 2024-12-19 ENCOUNTER — APPOINTMENT (OUTPATIENT)
Dept: ORTHOPEDIC SURGERY | Facility: CLINIC | Age: 74
End: 2024-12-19
Payer: MEDICARE

## 2025-01-09 ENCOUNTER — HOSPITAL ENCOUNTER (OUTPATIENT)
Dept: RADIOLOGY | Facility: EXTERNAL LOCATION | Age: 75
Discharge: HOME | End: 2025-01-09
Payer: MEDICARE

## 2025-01-09 ENCOUNTER — OFFICE VISIT (OUTPATIENT)
Dept: ORTHOPEDIC SURGERY | Facility: CLINIC | Age: 75
End: 2025-01-09
Payer: MEDICARE

## 2025-01-09 ENCOUNTER — HOSPITAL ENCOUNTER (OUTPATIENT)
Dept: RADIOLOGY | Facility: CLINIC | Age: 75
Discharge: HOME | End: 2025-01-09
Payer: MEDICARE

## 2025-01-09 DIAGNOSIS — M25.562 BILATERAL CHRONIC KNEE PAIN: ICD-10-CM

## 2025-01-09 DIAGNOSIS — M25.561 BILATERAL CHRONIC KNEE PAIN: ICD-10-CM

## 2025-01-09 DIAGNOSIS — M17.0 PRIMARY OSTEOARTHRITIS OF BOTH KNEES: ICD-10-CM

## 2025-01-09 DIAGNOSIS — G89.29 BILATERAL CHRONIC KNEE PAIN: ICD-10-CM

## 2025-01-09 PROCEDURE — 2500000004 HC RX 250 GENERAL PHARMACY W/ HCPCS (ALT 636 FOR OP/ED): Performed by: STUDENT IN AN ORGANIZED HEALTH CARE EDUCATION/TRAINING PROGRAM

## 2025-01-09 PROCEDURE — 99214 OFFICE O/P EST MOD 30 MIN: CPT | Performed by: STUDENT IN AN ORGANIZED HEALTH CARE EDUCATION/TRAINING PROGRAM

## 2025-01-09 PROCEDURE — 20611 DRAIN/INJ JOINT/BURSA W/US: CPT | Mod: 50 | Performed by: STUDENT IN AN ORGANIZED HEALTH CARE EDUCATION/TRAINING PROGRAM

## 2025-01-09 PROCEDURE — 1157F ADVNC CARE PLAN IN RCRD: CPT | Performed by: STUDENT IN AN ORGANIZED HEALTH CARE EDUCATION/TRAINING PROGRAM

## 2025-01-09 PROCEDURE — 1123F ACP DISCUSS/DSCN MKR DOCD: CPT | Performed by: STUDENT IN AN ORGANIZED HEALTH CARE EDUCATION/TRAINING PROGRAM

## 2025-01-09 PROCEDURE — 73564 X-RAY EXAM KNEE 4 OR MORE: CPT | Mod: 50

## 2025-01-09 RX ORDER — METHYLPREDNISOLONE ACETATE 40 MG/ML
80 INJECTION, SUSPENSION INTRA-ARTICULAR; INTRALESIONAL; INTRAMUSCULAR; SOFT TISSUE
Status: COMPLETED | OUTPATIENT
Start: 2025-01-09 | End: 2025-01-09

## 2025-01-09 RX ORDER — ROPIVACAINE HYDROCHLORIDE 5 MG/ML
2 INJECTION, SOLUTION EPIDURAL; INFILTRATION; PERINEURAL
Status: COMPLETED | OUTPATIENT
Start: 2025-01-09 | End: 2025-01-09

## 2025-01-09 RX ORDER — LIDOCAINE HYDROCHLORIDE 10 MG/ML
2 INJECTION, SOLUTION INFILTRATION; PERINEURAL
Status: COMPLETED | OUTPATIENT
Start: 2025-01-09 | End: 2025-01-09

## 2025-01-09 RX ADMIN — METHYLPREDNISOLONE ACETATE 80 MG: 40 INJECTION, SUSPENSION INTRA-ARTICULAR; INTRALESIONAL; INTRAMUSCULAR; INTRASYNOVIAL; SOFT TISSUE at 13:58

## 2025-01-09 RX ADMIN — LIDOCAINE HYDROCHLORIDE 2 ML: 10 INJECTION, SOLUTION INFILTRATION; PERINEURAL at 13:58

## 2025-01-09 RX ADMIN — ROPIVACAINE HYDROCHLORIDE 2 ML: 5 INJECTION EPIDURAL; INFILTRATION; PERINEURAL at 13:58

## 2025-01-09 NOTE — PROGRESS NOTES
Sports Medicine Office Note    Today's Date:  01/09/2025     HPI: Grace Velazquez is a 74 y.o. female with history of chronic venous insufficiency, distal lower extremity lymphedema, bilateral lower extremity lipedema, osteoarthritis of multiple joints including bilateral knees who presents today for evaluation of chronic bilateral knee pain, referred by Dr. Miguel.  She states she has had pain in both of her knees for many years with progressive worsening.  She was previously seen by Dr. Nance for management of her knee OA, having tried physical therapy, OTC anti-inflammatory/analgesics, Voltaren gel, and cortisone injections.  Last cortisone injections performed in 2018 and patient states she is not sure if she had significant improvement shortly after injections were performed.  She states she has been trying to manage her pain on her own since then.  Denies any known injury/trauma to either knee.  States knee pain is worse when going from seated to standing position and worse after prolonged periods of standing/walking.  States her pain does relieved with rest.  Due to degree of lymphedema/lipedema, she is unsure if she has any focal swelling in either knee.  Denies any redness, warmth, bruising, numbness, tingling, or weakness.  States pain is throughout the entire knee, but worse over medial aspect on both sides.    She has no other complaints.    Physical Examination:   There is a large amount of lipedema throughout bilateral lower extremities from proximal thigh to mid shin without evidence of erythema, ecchymosis, or warmth.  Unable to appreciate if any significant joint swelling/effusion in either knee.  Skin - no rashes, sores, or open lesions. Strength and sensory exams are otherwise normal. There is no clubbing, cyanosis.  Gait is antalgic, walker assisted, painful, and tandem.   RIGHT knee: Patella crepitus and grind are positive. There is tenderness to the medial and lateral joint lines. Flexion  and extension are without mechanical blocking. There is no appreciable instability with stress testing.  LEFT knee: Patella crepitus and grind are positive. There is tenderness to the medial and lateral joint lines. Flexion and extension are without mechanical blocking. There is no appreciable instability with stress testing.    Imaging:  Radiographs of bilateral knees obtained today were reviewed and revealed tricompartmental DJD, moderate to severe in lateral and patellofemoral compartments, severe in bilateral medial compartments, otherwise no acute osseous abnormalities.    The studies were reviewed by me personally in the office today.    Problem List Items Addressed This Visit             ICD-10-CM    Osteoarthritis M19.90    Relevant Orders    L Inj/Asp: bilateral knee    Point of Care Ultrasound (Completed)     Other Visit Diagnoses         Codes    Bilateral chronic knee pain     M25.561, M25.562, G89.29    Relevant Orders    XR knee 4+ views bilateral    Point of Care Ultrasound    L Inj/Asp: bilateral knee          Procedure Note:  After consent was obtained, the left knee was prepped in a sterile fashion. Ultrasound guidance was used to help insure proper needle placement into the  joint , decrease patient discomfort, and decrease collateral damage. The joint was visualized and Depo-Medrol 80 mg with lidocaine 1% 2 mL & ropivacaine 0.5% 2 mL were injected without any complications. Ultrasound images were saved on an internal file for later reference. The patient tolerated the procedure well and the area was cleaned and bandaged.    After consent was obtained, the right knee was prepped in a sterile fashion. Ultrasound guidance was used to help insure proper needle placement into the  joint , decrease patient discomfort, and decrease collateral damage. The joint was visualized and Depo-Medrol 80 mg with lidocaine 1% 2 mL & ropivacaine 0.5% 2 mL were injected without any complications. Ultrasound images  were saved on an internal file for later reference. The patient tolerated the procedure well and the area was cleaned and bandaged.  Patient ID: Grace Velazquez is a 74 y.o. female.    L Inj/Asp: bilateral knee on 1/9/2025 1:58 PM  Indications: pain  Details: 22 G needle, ultrasound-guided anterolateral approach  Medications (Right): 2 mL lidocaine 10 mg/mL (1 %); 80 mg methylPREDNISolone acetate 40 mg/mL; 2 mL ropivacaine 5 mg/mL (0.5 %)  Medications (Left): 2 mL lidocaine 10 mg/mL (1 %); 80 mg methylPREDNISolone acetate 40 mg/mL; 2 mL ropivacaine 5 mg/mL (0.5 %)  Outcome: tolerated well, no immediate complications  Procedure, treatment alternatives, risks and benefits explained, specific risks discussed. Consent was given by the patient. Immediately prior to procedure a time out was called to verify the correct patient, procedure, equipment, support staff and site/side marked as required. Patient was prepped and draped in the usual sterile fashion.         Assessment and Plan:    We reviewed the exam and imaging findings and discussed the conservative and surgical treatment options.  Given she is in a great deal of pain and unsure if cortisone injections previously provided pain relief, we agreed to perform diagnostic and hopefully therapeutic cortisone injection into bilateral knee joints.  She tolerated this well. Activity modifications were reviewed.  She will keep any scheduled follow-up with Dr. Miguel. Discussed the importance of regular home exercises.  Recommended prescription doses of Tylenol and encouraged use of ice or heat as needed for acute flares of pain.  She may otherwise continue naproxen 220-440 mg 1-2 times daily as needed for acute flares of pain, but discussed adverse effects of chronic NSAID use.  Due to severity of arthritis and bilateral knee joints and no known significant relief previously with cortisone injections, plan to submit for insurance approval of viscosupplementation  injections for bilateral knee DJD.  Plan for follow-up if/when viscosupplementation injections approved through insurance, otherwise may follow-up sooner if any new concerns arise.  Also encouraged patient to contact plastic surgery to discuss possible intervention for bilateral lower extremity lipedema as this may be amenable to intervention which may help relieve her knee pain.  Discussed this plan with the patient who is understanding and agreeable.    **This note was dictated using Dragon speech recognition software and was not corrected for spelling or grammatical errors**.    Anthony Desai,   Primary Care Sports Medicine  [facility] [place]

## 2025-01-17 ENCOUNTER — PATIENT MESSAGE (OUTPATIENT)
Dept: PRIMARY CARE | Facility: CLINIC | Age: 75
End: 2025-01-17
Payer: MEDICARE

## 2025-01-17 DIAGNOSIS — M79.672 LEFT FOOT PAIN: Primary | ICD-10-CM

## 2025-01-17 RX ORDER — IBUPROFEN 600 MG/1
600 TABLET ORAL 3 TIMES DAILY PRN
Qty: 21 TABLET | Refills: 1 | Status: SHIPPED | OUTPATIENT
Start: 2025-01-17 | End: 2025-01-31

## 2025-01-23 ASSESSMENT — RHEUMATOLOGY NEW PATIENT QUESTIONNAIRE
LOSS OF VISION: N
BEHAVIORAL CHANGES: N
EASY BRUISING: N
RASH OR ULCERS: N
FAINTING: N
UNEXPLAINED HEARING LOSS: N
DIFFICULTY BREATHING LYING DOWN: N
UNUSUAL FATIGUE: N
DOUBLE OR BLURRED VISION: N
PERSISTENT DIARRHEA: N
SKIN TIGHTNESS: N
EXCESSIVE HAIR LOSS (MORE THAN YOUR NORM): N
DRYNESS OF MOUTH: N
VAGINAL DRYNESS: N
ABNORMAL URINE: N
UNEXPLAINED WEIGHT CHANGE: N
HOARSE VOICE: N
ANXIETY: N
PAIN OR BURNING ON URINATION: N
SWOLLEN LEGS OR FEET: N
UNUSUAL BLEEDING: N
NAUSEA: N
COUGH: N
DIFFICULTY STAYING ASLEEP: N
EYE REDNESS: N
DIFFICULTY FALLING ASLEEP: N
LOSS OF CONSCIOUSNESS: N
SWOLLEN OR TENDER GLANDS: N
NIGHT SWEATS: N
FEVER: N
AGITATION: N
MORNING STIFFNESS: N
BLACK STOOLS: N
DEPRESSION: N
MUSCLE WEAKNESS: N
SKIN REDNESS: N
STOMACH PAIN: N
JOINT SWELLING: N
JOINT PAIN: Y
EASILY LOSING TEMPER: N
UNUSUALLY RAPID OR SLOWED HEART RATE: N
ANEMIA: N
SHORTNESS OF BREATH: N
RASH: N
SEIZURES: N
VOMITING OF BLOOD OR COFFEE GROUND CONSISTENCY MATERIAL: N
BLOOD IN STOOLS: N
HEADACHES: N
CHEST PAIN: N
MEMORY LOSS: N
EYE PAIN: N
INCREASED SUSCEPTIBILITY TO INFECTION: N
NODULES/BUMPS: N
EYE DRYNESS: N
JAUNDICE: N
SUN SENSITIVE (SUN ALLERGY): N
NUMBNESS OR TINGLING IN HANDS OR FEET: N
SORES IN MOUTH OR NOSE: N
COLOR CHANGES OF HANDS OR FEET IN THE COLD: N
HEARTBURN OR REFLUX: N
DIFFICULTY SWALLOWING: N

## 2025-01-24 ENCOUNTER — OFFICE VISIT (OUTPATIENT)
Dept: RHEUMATOLOGY | Facility: CLINIC | Age: 75
End: 2025-01-24
Payer: MEDICARE

## 2025-01-24 VITALS — SYSTOLIC BLOOD PRESSURE: 132 MMHG | BODY MASS INDEX: 54.25 KG/M2 | DIASTOLIC BLOOD PRESSURE: 78 MMHG | WEIGHT: 293 LBS

## 2025-01-24 DIAGNOSIS — M10.9 GOUT, UNSPECIFIED CAUSE, UNSPECIFIED CHRONICITY, UNSPECIFIED SITE: Primary | ICD-10-CM

## 2025-01-24 DIAGNOSIS — M19.90 OSTEOARTHRITIS, UNSPECIFIED OSTEOARTHRITIS TYPE, UNSPECIFIED SITE: ICD-10-CM

## 2025-01-24 PROCEDURE — 3078F DIAST BP <80 MM HG: CPT | Performed by: INTERNAL MEDICINE

## 2025-01-24 PROCEDURE — 99204 OFFICE O/P NEW MOD 45 MIN: CPT | Performed by: INTERNAL MEDICINE

## 2025-01-24 PROCEDURE — 3075F SYST BP GE 130 - 139MM HG: CPT | Performed by: INTERNAL MEDICINE

## 2025-01-24 PROCEDURE — 99214 OFFICE O/P EST MOD 30 MIN: CPT | Performed by: INTERNAL MEDICINE

## 2025-01-24 PROCEDURE — 1123F ACP DISCUSS/DSCN MKR DOCD: CPT | Performed by: INTERNAL MEDICINE

## 2025-01-24 PROCEDURE — 1157F ADVNC CARE PLAN IN RCRD: CPT | Performed by: INTERNAL MEDICINE

## 2025-01-24 RX ORDER — COLCHICINE 0.6 MG/1
0.6 TABLET ORAL DAILY
Qty: 30 TABLET | Refills: 3 | Status: SHIPPED | OUTPATIENT
Start: 2025-01-24 | End: 2025-05-24

## 2025-01-24 RX ORDER — ALLOPURINOL 100 MG/1
100 TABLET ORAL DAILY
Qty: 30 TABLET | Refills: 5 | Status: SHIPPED | OUTPATIENT
Start: 2025-01-24 | End: 2025-07-23

## 2025-01-24 NOTE — PROGRESS NOTES
"Previous patient for treatment of OP.    Ref per Dr. Guerrero for evaluation and treatment of gout.   C/O Right middle finger pain / swelling in November.  [facility] eval with diagnosis of cellulitis.   Clindomycin x 7 days with referral to hand specialty.  Eval with Dr. Hampton  x-ray with diagnosis of gout. Prednisone taper with relief but per patient, stomach upset and \" not feeling well \" on the medication.   Left foot ( top ) pain and swelling.  IBU otc with relief after 3 days.      HPI - she states she had gout dx in 2015 by her primary.  I saw her in 2016 and said probablye gout and told to take allopurinol, but she was hesistant to take it because she read that you had to be on it the rest of your life.  She recently had a flare in her R 3rd finger DIP.  She had pain that started in nov that didn't resolve until 2 wks ago.  The entire finger was swollen and red - abx helped some.  She more recently saw ortho who dx her with gout per x-ray according to pt.  Prednisone upset her stomach.  The top of her L foot had stabbing pain 1 evening, felt better during the day, then hurt in the evening again, then was gone by the next day.  She has not had recent typical gout flare.  She saw another ortho and had knee injections that helped in only 1 knee.  No other pain/swelling.  L knee is stiff in AM.   No CP, resp, or GI.  No numbness/tingling.  No fever, HA, sicca, mouth sores, raynauds, or rash.      FH - +arthritis.  +maternal hip fx.  +gout  SH - nonsmoker.  No EtOH, marijuana, or drugs    PE  Gen- NAD  Neck - supple, no LAD  CV - RRR no r/m/g  Lungs - CTA  Abd - +BS  Extr - 2+ DP, PT, and rad pulses.  +edema  Psych - nl affect  Neuro - unable to elicit BLE DTRs  Msk - no obvious synovitis.  +heberdons.  B knee pain with ROM    A/P - OA and presumed gout  Reviewed x-rays.  Sig OA changes, no erosive changes in hand.  +chrondrocalcinosis.  Severe knee OA.    Pt to call for immed eval if she has a flare  Start allopurinol " "and colchicine  Check labs next mo with labs for primary  She is having a DEXA from primary next mo.  +maternal hip fx.  She has a h/o R ankle fx - fell of stepladder and R wrist fx from \"[employer]\" many yrs ago  Follow up 3 mo or sooner PRN  "

## 2025-01-28 NOTE — PROGRESS NOTES
[facility]   Hand and Upper Extremity Service  Initial evaluation / Consultation        Consult requested by Referring Physician: None     Chief Complaint: Right middle finger pain     Patient is a pleasant 74-year-old female who presents with right middle finger pain.  This is located to the PIP joint.  She notes that the pain began on November 29 when she noticed some edema to the finger.  She went to urgent care and was diagnosed with possible cellulitis.  She was on clindamycin for 7 days without mild relief.  She has been off antibiotics for several days without any worsening of her symptoms.  She notes continued swelling at the PIP joint with pain and mild erythema.  She does have a diagnosis of gout.        Please refer to New Patient Intake Form scanned into patient's electronic record for self reported past medical history, past surgical history, medications, allergies, family history, social history and 10 point review of systems     Examination:  Constitutional: Oriented to person, place, and time.  Appears well-developed and well-nourished.  Head: Normocephalic and atraumatic.  Eyes: Pupils are equal, round, and reactive to light.  Cardiovascular: Intact distal pulses.   Pulmonary/Chest/Breast: Effort normal. No respiratory distress.  Neurological: Alert and oriented to person, place, and time.  Skin: Skin is warm and dry.  Psychiatric: normal mood and affect.  Behavior is normal.  Musculoskeletal: Right hand   There is moderate swelling to the right hand PIP joint of the long finger.  She is able to flex and extend the joint without any significant pain.  There is no obvious tophi present.  There is some mild erythema with mild warmth.  There is no streaking erythema up the finger or up the arm.  Her finger is held with a flexion contracture of approximately 30 degrees however she can make a full composite fist.  AIN, PIN, ulnar motors intact.  Sensation intact to  light touch radial, ulnar, median nerves.  Brisk capillary refill       Personal Interpretation of Diagnostic studies: XR of the right hand taken reviewed in office today dated 12/9/2024  There are no fracture dislocations.  There is multifocal arthritis which is most predominant at the DIP joint of the third finger.  Mild soft tissue swelling to the long finger.         Impression: Right long finger PIP joint pain likely gouty arthritis         In Office Procedures Performed: None         Medical Decision Making:   This is likely an inflammatory arthropathy as she has was on antibiotic without any significant relief.  She has also been on antibiotics without any worsening of her symptoms.  There is no streaking up her arm consistent with cellulitis.  At this point we will prescribe her a Medrol Dosepak to see if this help alleviate her pain.  She may follow-up as needed.  If she is not getting significant relief we may consider a corticosteroid injection.    She should follow back up with her primary care physician or rheumatology for further evaluation of inflammatory arthropathy         Medications Prescribed: None        Follow up: []           Will BeDO brandon  [facility]  Department of Orthopaedic Surgery  Hand and Upper Extremity Reconstruction           Dictation performed with the use of voice recognition software.  Syntax and grammatical errors may exist.

## 2025-02-06 ENCOUNTER — APPOINTMENT (OUTPATIENT)
Dept: ORTHOPEDIC SURGERY | Facility: CLINIC | Age: 75
End: 2025-02-06
Payer: MEDICARE

## 2025-02-06 ENCOUNTER — HOSPITAL ENCOUNTER (OUTPATIENT)
Dept: RADIOLOGY | Facility: EXTERNAL LOCATION | Age: 75
Discharge: HOME | End: 2025-02-06

## 2025-02-06 DIAGNOSIS — M17.0 PRIMARY OSTEOARTHRITIS OF BOTH KNEES: ICD-10-CM

## 2025-02-06 PROCEDURE — 1036F TOBACCO NON-USER: CPT | Performed by: STUDENT IN AN ORGANIZED HEALTH CARE EDUCATION/TRAINING PROGRAM

## 2025-02-06 PROCEDURE — 99214 OFFICE O/P EST MOD 30 MIN: CPT | Performed by: STUDENT IN AN ORGANIZED HEALTH CARE EDUCATION/TRAINING PROGRAM

## 2025-02-06 PROCEDURE — 20611 DRAIN/INJ JOINT/BURSA W/US: CPT | Mod: 50 | Performed by: STUDENT IN AN ORGANIZED HEALTH CARE EDUCATION/TRAINING PROGRAM

## 2025-02-06 PROCEDURE — 99214 OFFICE O/P EST MOD 30 MIN: CPT | Mod: 25 | Performed by: STUDENT IN AN ORGANIZED HEALTH CARE EDUCATION/TRAINING PROGRAM

## 2025-02-06 PROCEDURE — 1157F ADVNC CARE PLAN IN RCRD: CPT | Performed by: STUDENT IN AN ORGANIZED HEALTH CARE EDUCATION/TRAINING PROGRAM

## 2025-02-06 PROCEDURE — 1123F ACP DISCUSS/DSCN MKR DOCD: CPT | Performed by: STUDENT IN AN ORGANIZED HEALTH CARE EDUCATION/TRAINING PROGRAM

## 2025-02-06 PROCEDURE — 2500000004 HC RX 250 GENERAL PHARMACY W/ HCPCS (ALT 636 FOR OP/ED): Mod: JZ | Performed by: STUDENT IN AN ORGANIZED HEALTH CARE EDUCATION/TRAINING PROGRAM

## 2025-02-06 RX ADMIN — Medication 60 MG: at 13:26

## 2025-02-12 LAB
25(OH)D3+25(OH)D2 SERPL-MCNC: 55 NG/ML (ref 30–100)
ALBUMIN SERPL-MCNC: 4.1 G/DL (ref 3.6–5.1)
ALP SERPL-CCNC: 94 U/L (ref 37–153)
ALT SERPL-CCNC: 11 U/L (ref 6–29)
ANION GAP SERPL CALCULATED.4IONS-SCNC: 9 MMOL/L (CALC) (ref 7–17)
AST SERPL-CCNC: 14 U/L (ref 10–35)
BASOPHILS # BLD AUTO: 98 CELLS/UL (ref 0–200)
BASOPHILS NFR BLD AUTO: 1.6 %
BILIRUB SERPL-MCNC: 0.9 MG/DL (ref 0.2–1.2)
BUN SERPL-MCNC: 19 MG/DL (ref 7–25)
CALCIUM SERPL-MCNC: 9.1 MG/DL (ref 8.6–10.4)
CHLORIDE SERPL-SCNC: 106 MMOL/L (ref 98–110)
CHOLEST SERPL-MCNC: 169 MG/DL
CHOLEST/HDLC SERPL: 2.1 (CALC)
CO2 SERPL-SCNC: 27 MMOL/L (ref 20–32)
CREAT SERPL-MCNC: 0.65 MG/DL (ref 0.6–1)
EGFRCR SERPLBLD CKD-EPI 2021: 91 ML/MIN/1.73M2
EOSINOPHIL # BLD AUTO: 427 CELLS/UL (ref 15–500)
EOSINOPHIL NFR BLD AUTO: 7 %
ERYTHROCYTE [DISTWIDTH] IN BLOOD BY AUTOMATED COUNT: 13.9 % (ref 11–15)
EST. AVERAGE GLUCOSE BLD GHB EST-MCNC: 117 MG/DL
EST. AVERAGE GLUCOSE BLD GHB EST-SCNC: 6.5 MMOL/L
GLUCOSE SERPL-MCNC: 108 MG/DL (ref 65–99)
HBA1C MFR BLD: 5.7 % OF TOTAL HGB
HCT VFR BLD AUTO: 42.8 % (ref 35–45)
HDLC SERPL-MCNC: 80 MG/DL
HGB BLD-MCNC: 14.2 G/DL (ref 11.7–15.5)
LDLC SERPL CALC-MCNC: 71 MG/DL (CALC)
LYMPHOCYTES # BLD AUTO: 2147 CELLS/UL (ref 850–3900)
LYMPHOCYTES NFR BLD AUTO: 35.2 %
MCH RBC QN AUTO: 31.6 PG (ref 27–33)
MCHC RBC AUTO-ENTMCNC: 33.2 G/DL (ref 32–36)
MCV RBC AUTO: 95.3 FL (ref 80–100)
MONOCYTES # BLD AUTO: 769 CELLS/UL (ref 200–950)
MONOCYTES NFR BLD AUTO: 12.6 %
NEUTROPHILS # BLD AUTO: 2660 CELLS/UL (ref 1500–7800)
NEUTROPHILS NFR BLD AUTO: 43.6 %
NONHDLC SERPL-MCNC: 89 MG/DL (CALC)
PLATELET # BLD AUTO: 155 THOUSAND/UL (ref 140–400)
PMV BLD REES-ECKER: 12.1 FL (ref 7.5–12.5)
POTASSIUM SERPL-SCNC: 4.1 MMOL/L (ref 3.5–5.3)
PROT SERPL-MCNC: 7.2 G/DL (ref 6.1–8.1)
RBC # BLD AUTO: 4.49 MILLION/UL (ref 3.8–5.1)
SODIUM SERPL-SCNC: 142 MMOL/L (ref 135–146)
TRIGL SERPL-MCNC: 96 MG/DL
URATE SERPL-MCNC: 6 MG/DL (ref 2.5–7)
WBC # BLD AUTO: 6.1 THOUSAND/UL (ref 3.8–10.8)

## 2025-02-17 ENCOUNTER — OFFICE VISIT (OUTPATIENT)
Dept: PRIMARY CARE | Facility: CLINIC | Age: 75
End: 2025-02-17
Payer: MEDICARE

## 2025-02-17 ENCOUNTER — TELEPHONE (OUTPATIENT)
Dept: PRIMARY CARE | Facility: CLINIC | Age: 75
End: 2025-02-17

## 2025-02-17 VITALS
BODY MASS INDEX: 54.75 KG/M2 | HEART RATE: 80 BPM | OXYGEN SATURATION: 96 % | WEIGHT: 293 LBS | DIASTOLIC BLOOD PRESSURE: 68 MMHG | SYSTOLIC BLOOD PRESSURE: 136 MMHG

## 2025-02-17 DIAGNOSIS — R73.01 IFG (IMPAIRED FASTING GLUCOSE): ICD-10-CM

## 2025-02-17 DIAGNOSIS — I10 ESSENTIAL HYPERTENSION: Primary | ICD-10-CM

## 2025-02-17 DIAGNOSIS — Z13.89 SCREENING FOR HEMATURIA OR PROTEINURIA: ICD-10-CM

## 2025-02-17 DIAGNOSIS — N32.81 OVERACTIVE BLADDER: ICD-10-CM

## 2025-02-17 PROBLEM — J35.01 CHRONIC TONSILLITIS: Status: RESOLVED | Noted: 2023-08-10 | Resolved: 2025-02-17

## 2025-02-17 PROBLEM — M51.369 LUMBAR DEGENERATIVE DISC DISEASE: Status: RESOLVED | Noted: 2023-08-10 | Resolved: 2025-02-17

## 2025-02-17 PROBLEM — I87.2 CHRONIC VENOUS INSUFFICIENCY: Status: RESOLVED | Noted: 2023-08-10 | Resolved: 2025-02-17

## 2025-02-17 PROBLEM — R53.83 FATIGUE: Status: RESOLVED | Noted: 2023-08-10 | Resolved: 2025-02-17

## 2025-02-17 PROBLEM — M25.551 RIGHT HIP PAIN: Status: RESOLVED | Noted: 2023-08-10 | Resolved: 2025-02-17

## 2025-02-17 PROBLEM — R06.00 DYSPNEA: Status: RESOLVED | Noted: 2023-08-10 | Resolved: 2025-02-17

## 2025-02-17 PROBLEM — R06.00 NOCTURNAL DYSPNEA: Status: RESOLVED | Noted: 2023-08-11 | Resolved: 2025-02-17

## 2025-02-17 PROBLEM — R07.89 ATYPICAL CHEST PAIN: Status: RESOLVED | Noted: 2023-08-10 | Resolved: 2025-02-17

## 2025-02-17 PROBLEM — M43.00 SPONDYLOLYSIS: Status: RESOLVED | Noted: 2023-08-10 | Resolved: 2025-02-17

## 2025-02-17 PROBLEM — R39.15 URINARY URGENCY: Status: RESOLVED | Noted: 2023-08-10 | Resolved: 2025-02-17

## 2025-02-17 PROBLEM — M54.2 NECK PAIN: Status: RESOLVED | Noted: 2023-08-10 | Resolved: 2025-02-17

## 2025-02-17 PROBLEM — R39.89 URINE TROUBLES: Status: RESOLVED | Noted: 2023-08-10 | Resolved: 2025-02-17

## 2025-02-17 PROBLEM — K63.5 COLON POLYPS: Status: RESOLVED | Noted: 2023-08-10 | Resolved: 2025-02-17

## 2025-02-17 PROBLEM — M54.50 LUMBAR PAIN: Status: RESOLVED | Noted: 2023-08-10 | Resolved: 2025-02-17

## 2025-02-17 PROBLEM — R92.8 ABNORMAL MAMMOGRAM: Status: RESOLVED | Noted: 2023-08-10 | Resolved: 2025-02-17

## 2025-02-17 PROBLEM — J18.9 PNEUMONIA: Status: RESOLVED | Noted: 2024-02-13 | Resolved: 2025-02-17

## 2025-02-17 PROBLEM — M79.673 FOOT PAIN: Status: RESOLVED | Noted: 2023-08-10 | Resolved: 2025-02-17

## 2025-02-17 PROBLEM — M25.512 LEFT SHOULDER PAIN: Status: RESOLVED | Noted: 2023-08-10 | Resolved: 2025-02-17

## 2025-02-17 PROBLEM — M25.569 JOINT PAIN, KNEE: Status: RESOLVED | Noted: 2023-08-10 | Resolved: 2025-02-17

## 2025-02-17 PROBLEM — I87.8 VENOUS STASIS: Status: RESOLVED | Noted: 2024-02-13 | Resolved: 2025-02-17

## 2025-02-17 PROBLEM — R35.0 URINARY FREQUENCY: Status: RESOLVED | Noted: 2023-08-10 | Resolved: 2025-02-17

## 2025-02-17 PROBLEM — Z86.711 HISTORY OF PULMONARY EMBOLISM: Status: RESOLVED | Noted: 2025-02-17 | Resolved: 2025-02-17

## 2025-02-17 PROBLEM — E46 MALNUTRITION (MULTI): Status: RESOLVED | Noted: 2024-02-13 | Resolved: 2025-02-17

## 2025-02-17 PROBLEM — E55.9 VITAMIN D DEFICIENCY: Status: RESOLVED | Noted: 2023-08-10 | Resolved: 2025-02-17

## 2025-02-17 PROBLEM — R00.2 PALPITATIONS: Status: RESOLVED | Noted: 2023-08-10 | Resolved: 2025-02-17

## 2025-02-17 PROBLEM — J31.0 CHRONIC RHINITIS: Status: RESOLVED | Noted: 2023-08-10 | Resolved: 2025-02-17

## 2025-02-17 PROBLEM — R60.9 EDEMA: Status: RESOLVED | Noted: 2023-08-10 | Resolved: 2025-02-17

## 2025-02-17 PROBLEM — F32.A DEPRESSION: Status: RESOLVED | Noted: 2023-08-10 | Resolved: 2025-02-17

## 2025-02-17 PROBLEM — M54.9 NOTALGIA: Status: RESOLVED | Noted: 2023-08-10 | Resolved: 2025-02-17

## 2025-02-17 PROBLEM — D05.02 LOBULAR CARCINOMA IN SITU (LCIS) OF LEFT BREAST: Status: RESOLVED | Noted: 2023-08-10 | Resolved: 2025-02-17

## 2025-02-17 PROBLEM — R79.9 ABNORMAL BLOOD CHEMISTRY: Status: RESOLVED | Noted: 2023-08-10 | Resolved: 2025-02-17

## 2025-02-17 PROBLEM — E78.5 HYPERLIPIDEMIA: Status: RESOLVED | Noted: 2024-02-13 | Resolved: 2025-02-17

## 2025-02-17 PROBLEM — M19.90 ARTHRITIS: Status: RESOLVED | Noted: 2023-08-10 | Resolved: 2025-02-17

## 2025-02-17 PROBLEM — R05.9 COUGH: Status: RESOLVED | Noted: 2023-08-10 | Resolved: 2025-02-17

## 2025-02-17 PROBLEM — K21.9 ESOPHAGEAL REFLUX: Status: RESOLVED | Noted: 2023-08-10 | Resolved: 2025-02-17

## 2025-02-17 PROBLEM — Z00.00 ROUTINE GENERAL MEDICAL EXAMINATION AT HEALTH CARE FACILITY: Status: RESOLVED | Noted: 2023-08-11 | Resolved: 2025-02-17

## 2025-02-17 PROBLEM — Z86.711 HISTORY OF PULMONARY EMBOLISM: Status: ACTIVE | Noted: 2025-02-17

## 2025-02-17 PROBLEM — R91.8 LUNG NODULES: Status: RESOLVED | Noted: 2023-08-10 | Resolved: 2025-02-17

## 2025-02-17 PROBLEM — M76.72 PERONEAL TENDONITIS OF LEFT LOWER EXTREMITY: Status: RESOLVED | Noted: 2023-08-10 | Resolved: 2025-02-17

## 2025-02-17 PROBLEM — J45.909 ASTHMATIC BRONCHITIS (HHS-HCC): Status: RESOLVED | Noted: 2023-08-10 | Resolved: 2025-02-17

## 2025-02-17 PROBLEM — Z15.09: Status: RESOLVED | Noted: 2023-08-10 | Resolved: 2025-02-17

## 2025-02-17 PROCEDURE — 1126F AMNT PAIN NOTED NONE PRSNT: CPT | Performed by: PHYSICIAN ASSISTANT

## 2025-02-17 PROCEDURE — 99214 OFFICE O/P EST MOD 30 MIN: CPT | Performed by: PHYSICIAN ASSISTANT

## 2025-02-17 PROCEDURE — 1036F TOBACCO NON-USER: CPT | Performed by: PHYSICIAN ASSISTANT

## 2025-02-17 PROCEDURE — 1159F MED LIST DOCD IN RCRD: CPT | Performed by: PHYSICIAN ASSISTANT

## 2025-02-17 PROCEDURE — 1123F ACP DISCUSS/DSCN MKR DOCD: CPT | Performed by: PHYSICIAN ASSISTANT

## 2025-02-17 PROCEDURE — 3078F DIAST BP <80 MM HG: CPT | Performed by: PHYSICIAN ASSISTANT

## 2025-02-17 PROCEDURE — 1157F ADVNC CARE PLAN IN RCRD: CPT | Performed by: PHYSICIAN ASSISTANT

## 2025-02-17 PROCEDURE — 1160F RVW MEDS BY RX/DR IN RCRD: CPT | Performed by: PHYSICIAN ASSISTANT

## 2025-02-17 PROCEDURE — 1158F ADVNC CARE PLAN TLK DOCD: CPT | Performed by: PHYSICIAN ASSISTANT

## 2025-02-17 PROCEDURE — G2211 COMPLEX E/M VISIT ADD ON: HCPCS | Performed by: PHYSICIAN ASSISTANT

## 2025-02-17 PROCEDURE — 99214 OFFICE O/P EST MOD 30 MIN: CPT | Mod: 25 | Performed by: PHYSICIAN ASSISTANT

## 2025-02-17 PROCEDURE — 3075F SYST BP GE 130 - 139MM HG: CPT | Performed by: PHYSICIAN ASSISTANT

## 2025-02-17 ASSESSMENT — ENCOUNTER SYMPTOMS
ABDOMINAL PAIN: 0
LIGHT-HEADEDNESS: 0
BLOOD IN STOOL: 0
SHORTNESS OF BREATH: 0
HEADACHES: 0
DIZZINESS: 0

## 2025-02-17 ASSESSMENT — COLUMBIA-SUICIDE SEVERITY RATING SCALE - C-SSRS
1. IN THE PAST MONTH, HAVE YOU WISHED YOU WERE DEAD OR WISHED YOU COULD GO TO SLEEP AND NOT WAKE UP?: NO
2. HAVE YOU ACTUALLY HAD ANY THOUGHTS OF KILLING YOURSELF?: NO

## 2025-02-17 ASSESSMENT — PATIENT HEALTH QUESTIONNAIRE - PHQ9
2. FEELING DOWN, DEPRESSED OR HOPELESS: NOT AT ALL
SUM OF ALL RESPONSES TO PHQ9 QUESTIONS 1 AND 2: 0
1. LITTLE INTEREST OR PLEASURE IN DOING THINGS: NOT AT ALL

## 2025-02-17 ASSESSMENT — PAIN SCALES - GENERAL: PAINLEVEL_OUTOF10: 0-NO PAIN

## 2025-02-17 NOTE — TELEPHONE ENCOUNTER
Called and spoke with Kavya, radiologist at [facility], who states that DEXA can only takes about 10-15 min to get done. However, pt has to completely lay down on the table. Called and spoke with pt, and states that she will give it try to do bone DEXA.

## 2025-02-17 NOTE — PROGRESS NOTES
Subjective   Patient ID: Grace Velazquez is a 75 y.o. female who presents for Med Refill (Pt is here for medication refills / nr).    HPI   Hx ARF 2/2 to COVID (11/2022), lung nodules, idiopathic sleep related nonobstructive alveolar hypoventilation syndrome: follows with pulmonary medicine (Dr. Doss). uses supplemental 2-3L O2 NC at bedtime. Has upcoming FU w/pulmonology in April    HTN - stable on 25mg hydrochlorothiazide. Forgot to take med this morning. Not routinely checking home BP.    OAB - s/p cystoscopy + intradetrusor botox INJ. Currently managed w/50mg myrbetriq. Sxs have been stable lately and actually improving somewhat.    Prediabetes - A1c 5.7. recently started anti-inflammatory diet for gout. Unable to exercise due to lymphedema/lipoedema.    Gout - diagnosed via urgent care, XR w/multifocal OA - severe at 3rd DIP, moderate 3rd MCP, 3rd PIP, 2nd CMP, 1st CMC. managed w/100mg allopurinol daily. Uric acid 6.0 now managed by rheumatology.     B/l knee pain - managed by orthopedics. Tx w/bilateral CSI w/minimal improvement. just recently had gel injections 2 weeks ago. Starting to have some pain relief today.    Review of Systems   Eyes:  Negative for visual disturbance.   Respiratory:  Negative for shortness of breath.    Cardiovascular:  Negative for chest pain.   Gastrointestinal:  Negative for abdominal pain and blood in stool.   Neurological:  Negative for dizziness, light-headedness and headaches.       Objective   /68   Pulse 80   Wt 149 kg (329 lb)   SpO2 96%   BMI 54.75 kg/m²     Physical Exam  Constitutional:       Appearance: Normal appearance.   HENT:      Head: Normocephalic and atraumatic.   Eyes:      Extraocular Movements: Extraocular movements intact.      Conjunctiva/sclera: Conjunctivae normal.   Cardiovascular:      Rate and Rhythm: Normal rate and regular rhythm.      Heart sounds: Normal heart sounds.   Pulmonary:      Effort: Pulmonary effort is normal.      Breath  sounds: Normal breath sounds. No wheezing or rhonchi.   Musculoskeletal:      Cervical back: Normal range of motion and neck supple.   Skin:     General: Skin is warm.      Findings: No rash.   Neurological:      General: No focal deficit present.      Mental Status: She is alert.         Orders Only on 02/11/2025   Component Date Value Ref Range Status    URIC ACID 02/11/2025 6.0  2.5 - 7.0 mg/dL Final    Comment: Therapeutic target for gout patients: <6.0 mg/dL          WHITE BLOOD CELL COUNT 02/11/2025 6.1  3.8 - 10.8 Thousand/uL Final    RED BLOOD CELL COUNT 02/11/2025 4.49  3.80 - 5.10 Million/uL Final    HEMOGLOBIN 02/11/2025 14.2  11.7 - 15.5 g/dL Final    HEMATOCRIT 02/11/2025 42.8  35.0 - 45.0 % Final    MCV 02/11/2025 95.3  80.0 - 100.0 fL Final    MCH 02/11/2025 31.6  27.0 - 33.0 pg Final    MCHC 02/11/2025 33.2  32.0 - 36.0 g/dL Final    Comment: For adults, a slight decrease in the calculated MCHC  value (in the range of 30 to 32 g/dL) is most likely  not clinically significant; however, it should be  interpreted with caution in correlation with other  red cell parameters and the patient's clinical  condition.      RDW 02/11/2025 13.9  11.0 - 15.0 % Final    PLATELET COUNT 02/11/2025 155  140 - 400 Thousand/uL Final    MPV 02/11/2025 12.1  7.5 - 12.5 fL Final    ABSOLUTE NEUTROPHILS 02/11/2025 2,660  1,500 - 7,800 cells/uL Final    ABSOLUTE LYMPHOCYTES 02/11/2025 2,147  850 - 3,900 cells/uL Final    ABSOLUTE MONOCYTES 02/11/2025 769  200 - 950 cells/uL Final    ABSOLUTE EOSINOPHILS 02/11/2025 427  15 - 500 cells/uL Final    ABSOLUTE BASOPHILS 02/11/2025 98  0 - 200 cells/uL Final    NEUTROPHILS 02/11/2025 43.6  % Final    LYMPHOCYTES 02/11/2025 35.2  % Final    MONOCYTES 02/11/2025 12.6  % Final    EOSINOPHILS 02/11/2025 7.0  % Final    BASOPHILS 02/11/2025 1.6  % Final   Orders Only on 02/11/2025   Component Date Value Ref Range Status    CHOLESTEROL, TOTAL 02/11/2025 169  <200 mg/dL Final    HDL  CHOLESTEROL 02/11/2025 80  > OR = 50 mg/dL Final    TRIGLYCERIDES 02/11/2025 96  <150 mg/dL Final    LDL-CHOLESTEROL 02/11/2025 71  mg/dL (calc) Final    Comment: Reference range: <100     Desirable range <100 mg/dL for primary prevention;    <70 mg/dL for patients with CHD or diabetic patients   with > or = 2 CHD risk factors.     LDL-C is now calculated using the BishnuWhyte   calculation, which is a validated novel method providing   better accuracy than the Friedewald equation in the   estimation of LDL-C.   Paulo ESPINOZA et al. AUGUST. 2013;310(07): 0619-7068   (http://education.W-21.StowThat/faq/JPW788)      CHOL/HDLC RATIO 02/11/2025 2.1  <5.0 (calc) Final    NON HDL CHOLESTEROL 02/11/2025 89  <130 mg/dL (calc) Final    Comment: For patients with diabetes plus 1 major ASCVD risk   factor, treating to a non-HDL-C goal of <100 mg/dL   (LDL-C of <70 mg/dL) is considered a therapeutic   option.      GLUCOSE 02/11/2025 108 (H)  65 - 99 mg/dL Final    Comment:               Fasting reference interval     For someone without known diabetes, a glucose value  between 100 and 125 mg/dL is consistent with  prediabetes and should be confirmed with a  follow-up test.         UREA NITROGEN (BUN) 02/11/2025 19  7 - 25 mg/dL Final    CREATININE 02/11/2025 0.65  0.60 - 1.00 mg/dL Final    EGFR 02/11/2025 91  > OR = 60 mL/min/1.73m2 Final    SODIUM 02/11/2025 142  135 - 146 mmol/L Final    POTASSIUM 02/11/2025 4.1  3.5 - 5.3 mmol/L Final    CHLORIDE 02/11/2025 106  98 - 110 mmol/L Final    CARBON DIOXIDE 02/11/2025 27  20 - 32 mmol/L Final    ELECTROLYTE BALANCE 02/11/2025 9  7 - 17 mmol/L (calc) Final    CALCIUM 02/11/2025 9.1  8.6 - 10.4 mg/dL Final    PROTEIN, TOTAL 02/11/2025 7.2  6.1 - 8.1 g/dL Final    ALBUMIN 02/11/2025 4.1  3.6 - 5.1 g/dL Final    BILIRUBIN, TOTAL 02/11/2025 0.9  0.2 - 1.2 mg/dL Final    ALKALINE PHOSPHATASE 02/11/2025 94  37 - 153 U/L Final    AST 02/11/2025 14  10 - 35 U/L Final    ALT 02/11/2025  11  6 - 29 U/L Final    VITAMIN D,25-OH,TOTAL,IA 02/11/2025 55  30 - 100 ng/mL Final    Comment: Vitamin D Status         25-OH Vitamin D:     Deficiency:                    <20 ng/mL  Insufficiency:             20 - 29 ng/mL  Optimal:                 > or = 30 ng/mL     For 25-OH Vitamin D testing on patients on   D2-supplementation and patients for whom quantitation   of D2 and D3 fractions is required, the QuestAssureD(TM)  25-OH VIT D, (D2,D3), LC/MS/MS is recommended: order   code 26363 (patients >2yrs).     See Note 1     Note 1     For additional information, please refer to   http://education.IPPLEX.LookIt/faq/MQY666   (This link is being provided for informational/  educational purposes only.)      HEMOGLOBIN A1c 02/11/2025 5.7 (H)  <5.7 % of total Hgb Final    Comment: For someone without known diabetes, a hemoglobin   A1c value between 5.7% and 6.4% is consistent with  prediabetes and should be confirmed with a   follow-up test.     For someone with known diabetes, a value <7%  indicates that their diabetes is well controlled. A1c  targets should be individualized based on duration of  diabetes, age, comorbid conditions, and other  considerations.     This assay result is consistent with an increased risk  of diabetes.     Currently, no consensus exists regarding use of  hemoglobin A1c for diagnosis of diabetes for children.         eAG (mg/dL) 02/11/2025 117  mg/dL Final    eAG (mmol/L) 02/11/2025 6.5  mmol/L Final         Assessment/Plan   Problem List Items Addressed This Visit             ICD-10-CM    Essential hypertension - Primary I10    Overactive bladder N32.81    Body mass index (BMI) 50.0-59.9, adult (Multi) Z68.43    IFG (impaired fasting glucose) R73.01    Relevant Orders    Comprehensive Metabolic Panel    Hemoglobin A1C     Other Visit Diagnoses         Codes    Screening for hematuria or proteinuria     Z13.89    Relevant Orders    Urinalysis with Reflex Culture and Microscopic           Will look into liposuction for lipoedema. Needs DEXA scan but cannot lay flat for long period of time. Will reach out to radiology to see if there are alternative methods.

## 2025-02-20 DIAGNOSIS — I10 ESSENTIAL HYPERTENSION: ICD-10-CM

## 2025-02-20 RX ORDER — HYDROCHLOROTHIAZIDE 25 MG/1
25 TABLET ORAL DAILY
Qty: 90 TABLET | Refills: 1 | Status: SHIPPED | OUTPATIENT
Start: 2025-02-20

## 2025-04-02 ENCOUNTER — OFFICE VISIT (OUTPATIENT)
Dept: ORTHOPEDIC SURGERY | Facility: CLINIC | Age: 75
End: 2025-04-02
Payer: MEDICARE

## 2025-04-02 DIAGNOSIS — M25.562 BILATERAL CHRONIC KNEE PAIN: ICD-10-CM

## 2025-04-02 DIAGNOSIS — G89.29 BILATERAL CHRONIC KNEE PAIN: ICD-10-CM

## 2025-04-02 DIAGNOSIS — M17.0 PRIMARY OSTEOARTHRITIS OF BOTH KNEES: Primary | ICD-10-CM

## 2025-04-02 DIAGNOSIS — M25.561 BILATERAL CHRONIC KNEE PAIN: ICD-10-CM

## 2025-04-02 PROCEDURE — 1123F ACP DISCUSS/DSCN MKR DOCD: CPT | Performed by: STUDENT IN AN ORGANIZED HEALTH CARE EDUCATION/TRAINING PROGRAM

## 2025-04-02 PROCEDURE — 99214 OFFICE O/P EST MOD 30 MIN: CPT | Performed by: STUDENT IN AN ORGANIZED HEALTH CARE EDUCATION/TRAINING PROGRAM

## 2025-04-02 PROCEDURE — 1159F MED LIST DOCD IN RCRD: CPT | Performed by: STUDENT IN AN ORGANIZED HEALTH CARE EDUCATION/TRAINING PROGRAM

## 2025-04-02 PROCEDURE — 1157F ADVNC CARE PLAN IN RCRD: CPT | Performed by: STUDENT IN AN ORGANIZED HEALTH CARE EDUCATION/TRAINING PROGRAM

## 2025-04-02 PROCEDURE — 1036F TOBACCO NON-USER: CPT | Performed by: STUDENT IN AN ORGANIZED HEALTH CARE EDUCATION/TRAINING PROGRAM

## 2025-04-02 PROCEDURE — 1125F AMNT PAIN NOTED PAIN PRSNT: CPT | Performed by: STUDENT IN AN ORGANIZED HEALTH CARE EDUCATION/TRAINING PROGRAM

## 2025-04-02 RX ORDER — MELOXICAM 15 MG/1
15 TABLET ORAL DAILY PRN
Qty: 30 TABLET | Refills: 0 | Status: SHIPPED | OUTPATIENT
Start: 2025-04-02 | End: 2025-05-02

## 2025-04-02 ASSESSMENT — PAIN SCALES - GENERAL: PAINLEVEL_OUTOF10: 7

## 2025-04-02 ASSESSMENT — PAIN - FUNCTIONAL ASSESSMENT: PAIN_FUNCTIONAL_ASSESSMENT: 0-10

## 2025-04-02 ASSESSMENT — ENCOUNTER SYMPTOMS
OCCASIONAL FEELINGS OF UNSTEADINESS: 0
DEPRESSION: 0
LOSS OF SENSATION IN FEET: 0

## 2025-04-02 ASSESSMENT — PAIN DESCRIPTION - DESCRIPTORS: DESCRIPTORS: STABBING;ACHING

## 2025-04-02 NOTE — PROGRESS NOTES
Sports Medicine Office Note    Today's Date:  02/06/2025     HPI: Grace Velazquez is a 75 y.o. female with history of chronic venous insufficiency, distal lower extremity lymphedema, bilateral lower extremity lipedema, osteoarthritis of multiple joints including bilateral knees who presents today for follow-up of chronic bilateral knee pain.      1/9/2025: She states she has had pain in both of her knees for many years with progressive worsening.  She was previously seen by Dr. Nance for management of her knee OA, having tried physical therapy, OTC anti-inflammatory/analgesics, Voltaren gel, and cortisone injections.  Last cortisone injections performed in 2018 and patient states she is not sure if she had significant improvement shortly after injections were performed.  She states she has been trying to manage her pain on her own since then.  Denies any known injury/trauma to either knee.  States knee pain is worse when going from seated to standing position and worse after prolonged periods of standing/walking.  States her pain does relieved with rest.  Due to degree of lymphedema/lipedema, she is unsure if she has any focal swelling in either knee.  Denies any redness, warmth, bruising, numbness, tingling, or weakness.  States pain is throughout the entire knee, but worse over medial aspect on both sides.     2/6/2025: She presents today for follow-up of chronic bilateral knee pain with severe bilateral knee DJD, s/p bilateral knee joint cortisone injections, and for trial of viscosupplementation injections.  She states she did not get significant pain relief following cortisone injections.  She denies any interval injury/trauma or any new swelling, redness, warmth, bruising, radiation of pain, numbness, tingling, or weakness.  She would like to proceed with viscosupplementation injections today if appropriate.    She has no other complaints.    Physical Examination:     There is a large amount of lipedema  throughout bilateral lower extremities from proximal thigh to mid shin without evidence of erythema, ecchymosis, or warmth.  Unable to appreciate if any significant joint swelling/effusion in either knee.  Skin - no rashes, sores, or open lesions. Strength and sensory exams are otherwise normal. There is no clubbing, cyanosis.  Gait is antalgic, walker assisted, painful, and tandem.   RIGHT knee: Patella crepitus and grind are positive. There is tenderness to the medial and lateral joint lines. Flexion and extension are without mechanical blocking. There is no appreciable instability with stress testing.  LEFT knee: Patella crepitus and grind are positive. There is tenderness to the medial and lateral joint lines. Flexion and extension are without mechanical blocking. There is no appreciable instability with stress testing.    Imaging:  Radiographs of bilateral knees obtained 1/9/2025 were reviewed and revealed tricompartmental DJD, moderate to severe in lateral and patellofemoral compartments, severe in bilateral medial compartments, otherwise no acute osseous abnormalities.     The studies were reviewed by me personally in the office today.    === 01/09/25 ===    XR KNEE 4+ VIEWS BILATERAL    - Impression -  Advanced osteoarthritis bilateral knees worst in the medial  compartments.    Signed by: Itz Dunham 1/11/2025 5:26 PM  Dictation workstation:   [id number]    Problem List Items Addressed This Visit             ICD-10-CM    Osteoarthritis M19.90    Relevant Orders    Point of Care Ultrasound (Completed)    L Inj/Asp: bilateral knee     Procedure Note:  After consent was obtained, the right knee was prepped in a sterile fashion. Ultrasound guidance was used to help insure proper needle placement into the  joint , decrease patient discomfort, and decrease collateral damage. The joint was visualized and Durolane 60 mg was injected without any complications. Ultrasound images were saved on an internal file for  later reference. The patient tolerated the procedure well and the area was cleaned and bandaged.    After consent was obtained, the left knee was prepped in a sterile fashion. Ultrasound guidance was used to help insure proper needle placement into the  joint , decrease patient discomfort, and decrease collateral damage. The joint was visualized and Durolane 60 mg was injected without any complications. Ultrasound images were saved on an internal file for later reference. The patient tolerated the procedure well and the area was cleaned and bandaged.  Patient ID: Grace Velazquez is a 75 y.o. female.    L Inj/Asp: bilateral knee on 2/6/2025 1:26 PM  Indications: pain  Details: 22 G needle, ultrasound-guided anterolateral approach  Medications (Right): 60 mg sodium hyaluronate 60 mg/3 mL  Medications (Left): 60 mg sodium hyaluronate 60 mg/3 mL  Outcome: tolerated well, no immediate complications  Procedure, treatment alternatives, risks and benefits explained, specific risks discussed. Consent was given by the patient. Immediately prior to procedure a time out was called to verify the correct patient, procedure, equipment, support staff and site/side marked as required. Patient was prepped and draped in the usual sterile fashion.         Assessment and Plan:    We reviewed the exam and imaging findings and discussed the conservative and surgical treatment options. We agreed to bilateral knee ultrasound-guided viscosupplementation injections with Durolane. Patient was educated on the signs and symptoms of local reaction and infection and should call the office if experiencing any of these. Activity modifications discussed and recommended rest on the knee for the next 48 hours. After that, patient may return to normal activity. Discussed the importance of regular home exercises.  Recommended prescription doses of Tylenol, Voltaren gel, and encouraged use of ice as needed for acute flares of pain. Patient understands  that this is not a cure, but an attempt to buy some time, decrease discomfort, and slow the arthritic process. Patient may ultimately require surgery, but we will exhaust all of our conservative treatment options prior to that. Plan for follow-up in 8 weeks, otherwise may follow-up sooner if any new concerns arise.  Discussed this plan with the patient who is understanding and agreeable.    **This note was dictated using Dragon speech recognition software and was not corrected for spelling or grammatical errors**.    Anthony Desai,   Primary Care Sports Medicine  [facility]

## 2025-04-03 ENCOUNTER — APPOINTMENT (OUTPATIENT)
Dept: ORTHOPEDIC SURGERY | Facility: CLINIC | Age: 75
End: 2025-04-03
Payer: MEDICARE

## 2025-04-25 ENCOUNTER — OFFICE VISIT (OUTPATIENT)
Dept: RHEUMATOLOGY | Facility: CLINIC | Age: 75
End: 2025-04-25
Payer: MEDICARE

## 2025-04-25 VITALS — WEIGHT: 293 LBS | DIASTOLIC BLOOD PRESSURE: 78 MMHG | BODY MASS INDEX: 55.58 KG/M2 | SYSTOLIC BLOOD PRESSURE: 110 MMHG

## 2025-04-25 DIAGNOSIS — M10.9 GOUT, UNSPECIFIED CAUSE, UNSPECIFIED CHRONICITY, UNSPECIFIED SITE: ICD-10-CM

## 2025-04-25 DIAGNOSIS — M19.90 OSTEOARTHRITIS, UNSPECIFIED OSTEOARTHRITIS TYPE, UNSPECIFIED SITE: Primary | ICD-10-CM

## 2025-04-25 PROCEDURE — 99214 OFFICE O/P EST MOD 30 MIN: CPT | Performed by: INTERNAL MEDICINE

## 2025-04-25 PROCEDURE — 1157F ADVNC CARE PLAN IN RCRD: CPT | Performed by: INTERNAL MEDICINE

## 2025-04-25 PROCEDURE — 1159F MED LIST DOCD IN RCRD: CPT | Performed by: INTERNAL MEDICINE

## 2025-04-25 PROCEDURE — 3074F SYST BP LT 130 MM HG: CPT | Performed by: INTERNAL MEDICINE

## 2025-04-25 PROCEDURE — 1123F ACP DISCUSS/DSCN MKR DOCD: CPT | Performed by: INTERNAL MEDICINE

## 2025-04-25 PROCEDURE — 3078F DIAST BP <80 MM HG: CPT | Performed by: INTERNAL MEDICINE

## 2025-04-25 NOTE — PROGRESS NOTES
Recheck  Gout   New meloxicam for pain per Pain Management but patient has not taken.  Doing well with gout.  No recent flares.    HPI - She is doing well.  She has not had gout-type flares.  She saw sports med and had viscosupplementation, which helped some.   No other pain.  Knees swell.  AM stiffness few min.  No CP, resp, or GI.    PE  NAD  RRR II/VI syst murmur  CTA  2+ BLE edema  No synovitis  B knee pain with ROM    A/P - OA and presumed gout - doing well  Reviewed labs - does not need labs today  Pt to call for immed eval with any gout-type flares  Has not had DEXA from primary yet  Follow up 6 mo or sooner PRN

## 2025-05-04 ENCOUNTER — OFFICE VISIT (OUTPATIENT)
Dept: URGENT CARE | Age: 75
End: 2025-05-04
Payer: MEDICARE

## 2025-05-04 VITALS
DIASTOLIC BLOOD PRESSURE: 82 MMHG | WEIGHT: 293 LBS | SYSTOLIC BLOOD PRESSURE: 161 MMHG | TEMPERATURE: 97.8 F | HEIGHT: 64 IN | BODY MASS INDEX: 50.02 KG/M2 | OXYGEN SATURATION: 97 % | HEART RATE: 94 BPM

## 2025-05-04 DIAGNOSIS — N30.01 ACUTE CYSTITIS WITH HEMATURIA: Primary | ICD-10-CM

## 2025-05-04 DIAGNOSIS — R35.0 FREQUENCY OF URINATION: ICD-10-CM

## 2025-05-04 LAB
POC APPEARANCE, URINE: ABNORMAL
POC BILIRUBIN, URINE: NEGATIVE
POC BLOOD, URINE: ABNORMAL
POC COLOR, URINE: YELLOW
POC GLUCOSE, URINE: NEGATIVE MG/DL
POC KETONES, URINE: NEGATIVE MG/DL
POC LEUKOCYTES, URINE: ABNORMAL
POC NITRITE,URINE: NEGATIVE
POC PH, URINE: 6 PH
POC PROTEIN, URINE: NEGATIVE MG/DL
POC SPECIFIC GRAVITY, URINE: 1.01
POC UROBILINOGEN, URINE: 0.2 EU/DL

## 2025-05-04 PROCEDURE — 3079F DIAST BP 80-89 MM HG: CPT | Performed by: NURSE PRACTITIONER

## 2025-05-04 PROCEDURE — 99203 OFFICE O/P NEW LOW 30 MIN: CPT | Performed by: NURSE PRACTITIONER

## 2025-05-04 PROCEDURE — 1036F TOBACCO NON-USER: CPT | Performed by: NURSE PRACTITIONER

## 2025-05-04 PROCEDURE — 1160F RVW MEDS BY RX/DR IN RCRD: CPT | Performed by: NURSE PRACTITIONER

## 2025-05-04 PROCEDURE — 3077F SYST BP >= 140 MM HG: CPT | Performed by: NURSE PRACTITIONER

## 2025-05-04 PROCEDURE — 81003 URINALYSIS AUTO W/O SCOPE: CPT | Performed by: NURSE PRACTITIONER

## 2025-05-04 PROCEDURE — 1157F ADVNC CARE PLAN IN RCRD: CPT | Performed by: NURSE PRACTITIONER

## 2025-05-04 PROCEDURE — 1123F ACP DISCUSS/DSCN MKR DOCD: CPT | Performed by: NURSE PRACTITIONER

## 2025-05-04 PROCEDURE — 1159F MED LIST DOCD IN RCRD: CPT | Performed by: NURSE PRACTITIONER

## 2025-05-04 RX ORDER — CEPHALEXIN 500 MG/1
500 CAPSULE ORAL 2 TIMES DAILY
Qty: 14 CAPSULE | Refills: 0 | Status: SHIPPED | OUTPATIENT
Start: 2025-05-04 | End: 2025-05-11

## 2025-05-04 ASSESSMENT — ENCOUNTER SYMPTOMS
FREQUENCY: 1
FLANK PAIN: 1
NAUSEA: 1

## 2025-05-04 NOTE — PROGRESS NOTES
"Subjective   Patient ID: Grace Velazquez is a 75 y.o. female. They present today with a chief complaint of Difficulty Urinating (Frequency of urination/Nausea/Chills/RT Flank pain x 3 days).    History of Present Illness  Patient 75-year-old female presents today with the symptoms of frequency and difficulty in urination.  She also reports symptoms of nausea, chills, right flank pain in the past 3 days.  She denies vomiting or diarrhea.  She denies mental status changes.  She does not have a frequent history of UTI.    Past Medical History  Allergies as of 05/04/2025 - Reviewed 05/04/2025   Allergen Reaction Noted    Doxycycline Itching and Unknown 08/10/2023    Doxycycline calcium Diarrhea 10/10/2013    Erythromycin Unknown and Itching 10/10/2013    Erythromycin base Diarrhea and Itching 08/10/2023    Oxybutynin Dizziness 08/10/2023       Prescriptions Prior to Admission[1]     Medical History[2]    Surgical History[3]     reports that she has never smoked. She has never used smokeless tobacco. She reports that she does not currently use alcohol. She reports that she does not use drugs.    Review of Systems  Review of Systems   Gastrointestinal:  Positive for nausea.   Genitourinary:  Positive for flank pain, frequency and urgency.   All other systems reviewed and are negative.                                 Objective    Vitals:    05/04/25 1811   BP: 161/82   BP Location: Right arm   Patient Position: Standing   Pulse: 94   Temp: 36.6 °C (97.8 °F)   TempSrc: Oral   SpO2: 97%   Weight: 148 kg (326 lb)   Height: 1.626 m (5' 4\")     No LMP recorded. Patient has had a hysterectomy.    Physical Exam  Vitals reviewed.   General: Vitals Noted. No distress. Normocephalic.  Cardiovascular:     Heart sounds: Normal heart sounds, S1 normal and S2 normal. No murmur heard.     No friction rub.   Pulmonary:      Effort: Pulmonary effort is normal.      Breath sounds:  Lungs clear to auscultation bilaterally    Pharynx and " tonsils are not hyperemic, and without exudate.   Neck: Supple with no adenopathy.  Lymphadenopathy:   No cervical adenopathy on palpation  Lower Body: No right inguinal adenopathy. No left inguinal adenopathy.   Abdominal:      Palpations: There is no hepatomegaly, splenomegaly or mass. Abdomen is soft, non-tender, and non-distended. No suprapubic tenderness. No CVA tenderness.   Skin:     Comments: no rash   Neurological:      Cranial Nerves: Cranial nerves 2-12 are intact.      Sensory: No sensory deficit.      Motor: Motor function is intact.      Deep Tendon Reflexes: Reflexes are normal and symmetric.       Procedures    Point of Care Test & Imaging Results from this visit  Results for orders placed or performed in visit on 05/04/25   POCT UA Automated manually resulted   Result Value Ref Range    POC Color, Urine Yellow Straw, Yellow, Light-Yellow    POC Appearance, Urine Cloudy (A) Clear    POC Glucose, Urine NEGATIVE NEGATIVE mg/dl    POC Bilirubin, Urine NEGATIVE NEGATIVE    POC Ketones, Urine NEGATIVE NEGATIVE mg/dl    POC Specific Gravity, Urine 1.010 1.005 - 1.035    POC Blood, Urine TRACE-Intact (A) NEGATIVE    POC PH, Urine 6.0 No Reference Range Established PH    POC Protein, Urine NEGATIVE NEGATIVE mg/dl    POC Urobilinogen, Urine 0.2 0.2, 1.0 EU/DL    Poc Nitrite, Urine NEGATIVE NEGATIVE    POC Leukocytes, Urine SMALL (1+) (A) NEGATIVE      Imaging  No results found.    Cardiology, Vascular, and Other Imaging  No other imaging results found for the past 2 days      Diagnostic study results (if any) were reviewed by ANTON Martinez.    Assessment/Plan   Allergies, medications, history, and pertinent labs/EKGs/Imaging reviewed by ANTON Martinez.     Medical Decision Making  On exam patient is non toxic, in no distress. Testing for UA in the office is positive for low count (leuk, blood,  UC sent out. On examination as above, no concerns for appendicitis given presentation of   symptoms and examination. Normal bowel sounds, Lungs are clean on auscultation, normal heart sounds.   Keflex as prescribed for UTI. Will adjust treatment once cultures return, as needed.  Will be discharged with instructions to increase fluid intake, use of OTC for symptoms relief, avoid NSIADS. Advised to follow up with PCP for further management. Advised to return if symptoms worsen and needs to be reevaluated. Reviewed with patient signs and symptoms significant to present to ED. Patient verbalized understanding and agreed with the plan.       Orders and Diagnoses  Diagnoses and all orders for this visit:  Acute cystitis with hematuria  -     Urine Culture  -     cephalexin (Keflex) 500 mg capsule; Take 1 capsule (500 mg) by mouth 2 times a day for 7 days.  Frequency of urination  -     POCT UA Automated manually resulted  -     Urine Culture      Medical Admin Record      Patient disposition: Home    Electronically signed by ANTON Martinez  7:23 PM           [1] (Not in a hospital admission)   [2]   Past Medical History:  Diagnosis Date    Arthritis     Cataract 2017    Encounter for screening mammogram for malignant neoplasm of breast     Visit for screening mammogram    Gout 11/27/2024    Multiple fractures of ribs, unspecified side, initial encounter for closed fracture 10/23/2019    Multiple rib fractures    Other conditions influencing health status 07/09/2018    History of cough    Personal history of (healed) traumatic fracture 10/23/2019    History of fracture of ankle    Personal history of (healed) traumatic fracture 10/23/2019    History of fracture of wrist    Personal history of other diseases of the respiratory system     Personal history of asthma    Personal history of other endocrine, nutritional and metabolic disease 07/26/2013    History of vitamin D deficiency    Personal history of traumatic brain injury 10/23/2019    History of concussion    Varicella 1956   [3]   Past Surgical  History:  Procedure Laterality Date    BREAST BIOPSY Left     benign    CHOLECYSTECTOMY  1974    COLONOSCOPY  06/27/2013    Complete Colonoscopy    EYE SURGERY  2022    HYSTERECTOMY  2006    OTHER SURGICAL HISTORY  10/23/2019    Total hysterectomy with removal of both tubes and ovaries    OTHER SURGICAL HISTORY  10/23/2019    Cholecystectomy

## 2025-05-06 LAB — BACTERIA UR CULT: ABNORMAL

## 2025-05-07 ENCOUNTER — PATIENT MESSAGE (OUTPATIENT)
Dept: PRIMARY CARE | Facility: CLINIC | Age: 75
End: 2025-05-07
Payer: MEDICARE

## 2025-05-21 DIAGNOSIS — M10.9 GOUT, UNSPECIFIED CAUSE, UNSPECIFIED CHRONICITY, UNSPECIFIED SITE: ICD-10-CM

## 2025-05-21 RX ORDER — ALLOPURINOL 100 MG/1
100 TABLET ORAL DAILY
Qty: 30 TABLET | Refills: 4 | Status: SHIPPED | OUTPATIENT
Start: 2025-05-21 | End: 2025-05-21

## 2025-05-21 RX ORDER — ALLOPURINOL 100 MG/1
100 TABLET ORAL DAILY
Qty: 90 TABLET | Refills: 1 | Status: SHIPPED | OUTPATIENT
Start: 2025-05-21

## 2025-05-21 RX ORDER — COLCHICINE 0.6 MG/1
0.6 TABLET ORAL DAILY
Qty: 30 TABLET | Refills: 4 | Status: SHIPPED | OUTPATIENT
Start: 2025-05-21 | End: 2025-09-18

## 2025-05-28 ENCOUNTER — OFFICE VISIT (OUTPATIENT)
Dept: URGENT CARE | Age: 75
End: 2025-05-28
Payer: MEDICARE

## 2025-05-28 VITALS
TEMPERATURE: 98.5 F | BODY MASS INDEX: 50.02 KG/M2 | RESPIRATION RATE: 18 BRPM | DIASTOLIC BLOOD PRESSURE: 83 MMHG | HEIGHT: 64 IN | OXYGEN SATURATION: 95 % | WEIGHT: 293 LBS | HEART RATE: 100 BPM | SYSTOLIC BLOOD PRESSURE: 160 MMHG

## 2025-05-28 DIAGNOSIS — N30.01 ACUTE CYSTITIS WITH HEMATURIA: Primary | ICD-10-CM

## 2025-05-28 LAB
POC APPEARANCE, URINE: ABNORMAL
POC BILIRUBIN, URINE: NEGATIVE
POC BLOOD, URINE: ABNORMAL
POC COLOR, URINE: YELLOW
POC GLUCOSE, URINE: NEGATIVE MG/DL
POC KETONES, URINE: NEGATIVE MG/DL
POC LEUKOCYTES, URINE: ABNORMAL
POC NITRITE,URINE: NEGATIVE
POC PH, URINE: 6 PH
POC PROTEIN, URINE: ABNORMAL MG/DL
POC SPECIFIC GRAVITY, URINE: 1.01
POC UROBILINOGEN, URINE: 0.2 EU/DL

## 2025-05-28 RX ORDER — AMOXICILLIN 875 MG/1
875 TABLET, COATED ORAL 2 TIMES DAILY
Qty: 14 TABLET | Refills: 0 | Status: SHIPPED | OUTPATIENT
Start: 2025-05-28 | End: 2025-06-04

## 2025-05-28 RX ORDER — CEPHALEXIN 500 MG/1
500 CAPSULE ORAL 3 TIMES DAILY
Qty: 21 CAPSULE | Refills: 0 | Status: SHIPPED | OUTPATIENT
Start: 2025-05-28 | End: 2025-05-28 | Stop reason: ENTERED-IN-ERROR

## 2025-05-28 NOTE — PROGRESS NOTES
"Subjective   Patient ID: Grace Velazquez is a 75 y.o. female. They present today with a chief complaint of UTI SX (UTI SX 4 DAYS ).    History of Present Illness  Patient is a pleasant 75-year-old white female, past medical history of hypertension, presenting to the clinic with complaint of urinary frequency.  Patient is reporting approximately 4-day history of burning with urination with associated frequency and urgency.  She denies any abdominal pain, nausea, vomiting.  No back pain or flank pain.  Denies any vaginal bleeding or discharge.  States she has had UTIs in the past and this feels very similar.  Denies any fever or chills.  States she is eating and drinking well without difficulty.          Past Medical History  Allergies as of 05/28/2025 - Reviewed 05/28/2025   Allergen Reaction Noted    Doxycycline Itching and Unknown 08/10/2023    Doxycycline calcium Diarrhea 10/10/2013    Erythromycin Unknown and Itching 10/10/2013    Erythromycin base Diarrhea and Itching 08/10/2023    Oxybutynin Dizziness 08/10/2023       Prescriptions Prior to Admission[1]       Medical History[2]    Surgical History[3]     reports that she has never smoked. She has never used smokeless tobacco. She reports that she does not currently use alcohol. She reports that she does not use drugs.    Review of Systems  Review of Systems                               Objective    Vitals:    05/28/25 1349   BP: 160/83   Pulse: 100   Resp: 18   Temp: 36.9 °C (98.5 °F)   TempSrc: Oral   SpO2: 95%   Weight: 148 kg (326 lb)   Height: 1.626 m (5' 4\")     No LMP recorded. Patient has had a hysterectomy.    Physical Exam  General: Vitals Noted. No distress. Normocephalic.     HEENT: TMs normal, EOMI, normal conjunctiva, patent nares, Normal OP    Neck: Supple with no adenopathy.     Cardiac: Regular Rate and Rhythm. No murmur.     Pulmonary: Equal breath sounds bilaterally. No wheezes, rhonchi, or rales.    Abdomen: Soft, non-tender, with normal " bowel sounds.  No CVA tenderness bilaterally.    Musculoskeletal: Moves all extremities, no effusion, no edema.     Skin: No obvious rashes.    Procedures    Point of Care Test & Imaging Results from this visit    Imaging  No results found.    Cardiology, Vascular, and Other Imaging  No other imaging results found for the past 2 days      Diagnostic study results (if any) were reviewed by Santiago Adair PA-C.    Assessment/Plan   Allergies, medications, history, and pertinent labs/EKGs/Imaging reviewed by Santiago Adair PA-C.     Medical Decision Making  Patient was seen eval in the clinic for complaint of urinary symptoms.  On exam patient is nontoxic very well-appearing resting comfortably no acute distress.  Vital signs are stable, afebrile.  Chest is clear, heart is regular, belly is diffusely soft and nontender.  No CVA tenderness bilaterally.  Urinalysis was performed in the clinic which does show concern for acute urinary tract infection.  Will place on amoxicillin 875 mg twice daily x 7 days per previous cultutre.  Culture will be sent.  Advised to drink plenty of clear fluids.  Minimal concern for acute pyelonephritis at this time given lack of CVA tenderness.  Minimal concern for acute intra-abdominal etiologies given very benign and reassuring abdominal exam.  She be discharged home at this time.  I reviewed my impression, plan, strict return precautions with the patient.  She expresses understanding and agreement plan of care.    Orders and Diagnoses  Diagnoses and all orders for this visit:  Acute cystitis with hematuria  -     POCT UA Automated manually resulted  -     cephalexin (Keflex) 500 mg capsule; Take 1 capsule (500 mg) by mouth 3 times a day for 7 days.        Medical Admin Record      Follow Up Instructions  No follow-ups on file.    Patient disposition: Home    Electronically signed by Santiago Adair PA-C  1:56 PM         [1] (Not in a hospital admission)  [2]   Past  Medical History:  Diagnosis Date    Arthritis     Cataract 2017    Encounter for screening mammogram for malignant neoplasm of breast     Visit for screening mammogram    Gout 11/27/2024    Multiple fractures of ribs, unspecified side, initial encounter for closed fracture 10/23/2019    Multiple rib fractures    Other conditions influencing health status 07/09/2018    History of cough    Personal history of (healed) traumatic fracture 10/23/2019    History of fracture of ankle    Personal history of (healed) traumatic fracture 10/23/2019    History of fracture of wrist    Personal history of other diseases of the respiratory system     Personal history of asthma    Personal history of other endocrine, nutritional and metabolic disease 07/26/2013    History of vitamin D deficiency    Personal history of traumatic brain injury 10/23/2019    History of concussion    Varicella 1956   [3]   Past Surgical History:  Procedure Laterality Date    BREAST BIOPSY Left     benign    CHOLECYSTECTOMY  1974    COLONOSCOPY  06/27/2013    Complete Colonoscopy    EYE SURGERY  2022    HYSTERECTOMY  2006    OTHER SURGICAL HISTORY  10/23/2019    Total hysterectomy with removal of both tubes and ovaries    OTHER SURGICAL HISTORY  10/23/2019    Cholecystectomy

## 2025-05-30 LAB — BACTERIA UR CULT: ABNORMAL

## 2025-06-12 ENCOUNTER — OFFICE VISIT (OUTPATIENT)
Dept: PRIMARY CARE | Facility: CLINIC | Age: 75
End: 2025-06-12
Payer: MEDICARE

## 2025-06-12 VITALS
HEART RATE: 76 BPM | BODY MASS INDEX: 57.02 KG/M2 | SYSTOLIC BLOOD PRESSURE: 148 MMHG | WEIGHT: 293 LBS | OXYGEN SATURATION: 96 % | DIASTOLIC BLOOD PRESSURE: 62 MMHG

## 2025-06-12 DIAGNOSIS — R39.9 UTI SYMPTOMS: ICD-10-CM

## 2025-06-12 DIAGNOSIS — J96.11 CHRONIC RESPIRATORY FAILURE WITH HYPOXIA: Primary | ICD-10-CM

## 2025-06-12 DIAGNOSIS — R23.8 BULLA: ICD-10-CM

## 2025-06-12 PROBLEM — R73.01 IFG (IMPAIRED FASTING GLUCOSE): Status: RESOLVED | Noted: 2025-02-17 | Resolved: 2025-06-12

## 2025-06-12 PROBLEM — G47.34 IDIOPATHIC SLEEP RELATED NONOBSTRUCTIVE ALVEOLAR HYPOVENTILATION: Status: RESOLVED | Noted: 2024-02-13 | Resolved: 2025-06-12

## 2025-06-12 LAB
POC APPEARANCE, URINE: CLEAR
POC BILIRUBIN, URINE: NEGATIVE
POC BLOOD, URINE: NEGATIVE
POC COLOR, URINE: YELLOW
POC GLUCOSE, URINE: NEGATIVE MG/DL
POC KETONES, URINE: NEGATIVE MG/DL
POC LEUKOCYTES, URINE: ABNORMAL
POC NITRITE,URINE: NEGATIVE
POC PH, URINE: 5.5 PH
POC PROTEIN, URINE: NEGATIVE MG/DL
POC SPECIFIC GRAVITY, URINE: 1.02
POC UROBILINOGEN, URINE: 0.2 EU/DL

## 2025-06-12 PROCEDURE — 3078F DIAST BP <80 MM HG: CPT | Performed by: PHYSICIAN ASSISTANT

## 2025-06-12 PROCEDURE — 1160F RVW MEDS BY RX/DR IN RCRD: CPT | Performed by: PHYSICIAN ASSISTANT

## 2025-06-12 PROCEDURE — 99213 OFFICE O/P EST LOW 20 MIN: CPT | Performed by: PHYSICIAN ASSISTANT

## 2025-06-12 PROCEDURE — 1159F MED LIST DOCD IN RCRD: CPT | Performed by: PHYSICIAN ASSISTANT

## 2025-06-12 PROCEDURE — 1036F TOBACCO NON-USER: CPT | Performed by: PHYSICIAN ASSISTANT

## 2025-06-12 PROCEDURE — 1125F AMNT PAIN NOTED PAIN PRSNT: CPT | Performed by: PHYSICIAN ASSISTANT

## 2025-06-12 PROCEDURE — 81003 URINALYSIS AUTO W/O SCOPE: CPT | Performed by: PHYSICIAN ASSISTANT

## 2025-06-12 PROCEDURE — 3077F SYST BP >= 140 MM HG: CPT | Performed by: PHYSICIAN ASSISTANT

## 2025-06-12 PROCEDURE — G2211 COMPLEX E/M VISIT ADD ON: HCPCS | Performed by: PHYSICIAN ASSISTANT

## 2025-06-12 ASSESSMENT — ENCOUNTER SYMPTOMS
HEMATURIA: 0
DYSURIA: 0
WOUND: 1
FREQUENCY: 0
FEVER: 0

## 2025-06-12 ASSESSMENT — COLUMBIA-SUICIDE SEVERITY RATING SCALE - C-SSRS
2. HAVE YOU ACTUALLY HAD ANY THOUGHTS OF KILLING YOURSELF?: NO
6. HAVE YOU EVER DONE ANYTHING, STARTED TO DO ANYTHING, OR PREPARED TO DO ANYTHING TO END YOUR LIFE?: NO
1. IN THE PAST MONTH, HAVE YOU WISHED YOU WERE DEAD OR WISHED YOU COULD GO TO SLEEP AND NOT WAKE UP?: NO

## 2025-06-12 ASSESSMENT — PATIENT HEALTH QUESTIONNAIRE - PHQ9
1. LITTLE INTEREST OR PLEASURE IN DOING THINGS: NOT AT ALL
SUM OF ALL RESPONSES TO PHQ9 QUESTIONS 1 AND 2: 0
2. FEELING DOWN, DEPRESSED OR HOPELESS: NOT AT ALL

## 2025-06-12 ASSESSMENT — PAIN SCALES - GENERAL: PAINLEVEL_OUTOF10: 6

## 2025-06-12 NOTE — PROGRESS NOTES
Subjective   Patient ID: Grace Velazquez is a 75 y.o. female who presents for Mass (Pt is here for a lump on her left leg, which she noticed yesterday / nr).    HPI   5/4 on keflex for UTI but had side effects of diarrhea. She was switched to amoxicillin and has resolution of sxs but would like to repeat urinalysis to make sure. Denies abd pressure, urinary frequency/urgency, dysuria.    Here today w/LLE blister that she first noticed yesterday. Has a hx of lymphedema + lipoedema. Blister is not painful, closed, no surrounding erythema. No fever.    Hx chronic respiratory failure - managed by Dr. Doss. Not tolerating oxygen due to facial rash. Has upcoming nocturnal pOx test and may discontinue oxygen for good.    Review of Systems   Constitutional:  Negative for fever.   Genitourinary:  Negative for dysuria, frequency, hematuria and urgency.   Skin:  Positive for wound. Negative for rash.       Objective   /62   Pulse 76   Wt (!) 151 kg (332 lb 3.2 oz)   SpO2 96%   BMI 57.02 kg/m²     Physical Exam  Constitutional:       Appearance: Normal appearance.   HENT:      Head: Normocephalic and atraumatic.   Eyes:      Extraocular Movements: Extraocular movements intact.      Conjunctiva/sclera: Conjunctivae normal.   Cardiovascular:      Rate and Rhythm: Normal rate and regular rhythm.      Heart sounds: Murmur heard.   Pulmonary:      Effort: Pulmonary effort is normal.      Breath sounds: Normal breath sounds. No wheezing or rhonchi.   Musculoskeletal:      Cervical back: Normal range of motion and neck supple.   Skin:     General: Skin is warm.      Findings: Lesion (LLE medial lower shin with clear fluid filled bulla approx 4cm in size. no surrounding erythema or warmth) present.   Neurological:      General: No focal deficit present.      Mental Status: She is alert.         Assessment/Plan   Problem List Items Addressed This Visit           ICD-10-CM    Chronic respiratory failure with hypoxia - Primary  J96.11     Other Visit Diagnoses         Codes      Bulla     R23.8      UTI symptoms     R39.9    Relevant Orders    POCT UA Automated manually resulted          Bulla cleansed with soap + water, covered w/gauze + non adhesive bandage. Discussed natural resolution and to keep covered/protected. Discussed s/sx infx and when to call the office.

## 2025-06-14 LAB — BACTERIA UR CULT: ABNORMAL

## 2025-06-15 ENCOUNTER — TELEPHONE (OUTPATIENT)
Dept: PRIMARY CARE | Facility: CLINIC | Age: 75
End: 2025-06-15
Payer: MEDICARE

## 2025-06-15 NOTE — TELEPHONE ENCOUNTER
Urine culture showed small amounts of same bacteria she had before. Typically we don't treat when the levels are this low but if she is having any symptoms, I will send her a prescription for bactrim. Bactrim would treat both bacteria that were seen in the urine. Let me know if she tolerates this med okay and I will send it.

## 2025-06-17 NOTE — TELEPHONE ENCOUNTER
Called and spoke with pt, who verbally understands. Pt states that she is feeling okay and does not have any UTI symptoms.

## 2025-07-24 ENCOUNTER — OFFICE VISIT (OUTPATIENT)
Dept: PRIMARY CARE | Facility: CLINIC | Age: 75
End: 2025-07-24
Payer: MEDICARE

## 2025-07-24 VITALS
BODY MASS INDEX: 58.02 KG/M2 | OXYGEN SATURATION: 97 % | DIASTOLIC BLOOD PRESSURE: 64 MMHG | SYSTOLIC BLOOD PRESSURE: 134 MMHG | HEART RATE: 93 BPM | WEIGHT: 293 LBS

## 2025-07-24 DIAGNOSIS — R39.9 UTI SYMPTOMS: Primary | ICD-10-CM

## 2025-07-24 DIAGNOSIS — N30.01 ACUTE CYSTITIS WITH HEMATURIA: ICD-10-CM

## 2025-07-24 LAB
POC APPEARANCE, URINE: ABNORMAL
POC BILIRUBIN, URINE: NEGATIVE
POC BLOOD, URINE: ABNORMAL
POC COLOR, URINE: YELLOW
POC GLUCOSE, URINE: NEGATIVE MG/DL
POC KETONES, URINE: NEGATIVE MG/DL
POC LEUKOCYTES, URINE: ABNORMAL
POC NITRITE,URINE: NEGATIVE
POC PH, URINE: 5.5 PH
POC PROTEIN, URINE: ABNORMAL MG/DL
POC SPECIFIC GRAVITY, URINE: 1.02
POC UROBILINOGEN, URINE: 0.2 EU/DL

## 2025-07-24 PROCEDURE — 99214 OFFICE O/P EST MOD 30 MIN: CPT

## 2025-07-24 PROCEDURE — 3075F SYST BP GE 130 - 139MM HG: CPT

## 2025-07-24 PROCEDURE — 1159F MED LIST DOCD IN RCRD: CPT

## 2025-07-24 PROCEDURE — 3078F DIAST BP <80 MM HG: CPT

## 2025-07-24 PROCEDURE — 81003 URINALYSIS AUTO W/O SCOPE: CPT | Mod: QW

## 2025-07-24 PROCEDURE — 1036F TOBACCO NON-USER: CPT

## 2025-07-24 PROCEDURE — G2211 COMPLEX E/M VISIT ADD ON: HCPCS

## 2025-07-24 PROCEDURE — 1125F AMNT PAIN NOTED PAIN PRSNT: CPT

## 2025-07-24 RX ORDER — NITROFURANTOIN 25; 75 MG/1; MG/1
100 CAPSULE ORAL 2 TIMES DAILY
Qty: 20 CAPSULE | Refills: 0 | Status: SHIPPED | OUTPATIENT
Start: 2025-07-24 | End: 2025-08-03

## 2025-07-24 RX ORDER — IBUPROFEN 600 MG/1
600 TABLET, FILM COATED ORAL EVERY 6 HOURS PRN
COMMUNITY

## 2025-07-24 ASSESSMENT — PATIENT HEALTH QUESTIONNAIRE - PHQ9
SUM OF ALL RESPONSES TO PHQ9 QUESTIONS 1 AND 2: 0
1. LITTLE INTEREST OR PLEASURE IN DOING THINGS: NOT AT ALL
2. FEELING DOWN, DEPRESSED OR HOPELESS: NOT AT ALL

## 2025-07-24 ASSESSMENT — ENCOUNTER SYMPTOMS
HEMATURIA: 0
CHILLS: 1
DYSURIA: 0
FLANK PAIN: 0
FEVER: 1

## 2025-07-24 ASSESSMENT — PAIN SCALES - GENERAL: PAINLEVEL_OUTOF10: 4

## 2025-07-24 NOTE — PROGRESS NOTES
Subjective   Patient ID: Grace Velazquez is a 75 y.o. female who presents for UTI (Symptoms are cloudy urine, chills, fever, frequency, which a few days ago / nr).    Also had recent tooth pulled out, having no pain or swelling in are and has been in contact with dentist.    UTI   This is a new problem. The current episode started in the past 7 days. The problem occurs every urination. The problem has been gradually worsening. Associated symptoms include chills and urgency. Pertinent negatives include no flank pain or hematuria. Associated symptoms comments: Cloudy urine. Her past medical history is significant for recurrent UTIs.        Review of Systems   Constitutional:  Positive for chills and fever (101).   Genitourinary:  Positive for enuresis and urgency. Negative for dysuria, flank pain and hematuria.       Objective   /64   Pulse 93   Wt (!) 153 kg (338 lb)   SpO2 97%   BMI 58.02 kg/m²     Physical Exam  Constitutional:       Appearance: Normal appearance. She is ill-appearing.   HENT:      Mouth/Throat:        Comments: Suture present where tooth pulled but no redness, swelling, or drainage.     Cardiovascular:      Rate and Rhythm: Normal rate and regular rhythm.      Heart sounds: Murmur heard.   Pulmonary:      Effort: Pulmonary effort is normal.      Breath sounds: Normal breath sounds. No wheezing, rhonchi or rales.   Abdominal:      General: Abdomen is flat. Bowel sounds are normal.      Palpations: Abdomen is soft. There is no hepatomegaly, splenomegaly or mass.      Tenderness: There is no abdominal tenderness. There is no right CVA tenderness, left CVA tenderness, guarding or rebound.     Skin:     General: Skin is warm and dry.      Findings: No rash.     Neurological:      Mental Status: She is alert.     Psychiatric:         Mood and Affect: Mood and affect normal.         Assessment/Plan   Diagnoses and all orders for this visit:  UTI symptoms  -     POCT UA Automated manually  resulted  -     Urine Culture  Acute cystitis with hematuria  -     nitrofurantoin, macrocrystal-monohydrate, (Macrobid) 100 mg capsule; Take 1 capsule (100 mg) by mouth 2 times a day for 10 days.  If symptoms worsening go to ER/urgent care this weekend. Unable to tolerate Kelfex, and has been on multiple penicillins recently with tooth infection. Unable to tolerate doxycyline, so given Macrobid.

## 2025-07-27 LAB — BACTERIA UR CULT: ABNORMAL

## 2025-07-28 ENCOUNTER — RESULTS FOLLOW-UP (OUTPATIENT)
Dept: PRIMARY CARE | Facility: CLINIC | Age: 75
End: 2025-07-28
Payer: MEDICARE

## 2025-07-28 DIAGNOSIS — N30.01 ACUTE CYSTITIS WITH HEMATURIA: Primary | ICD-10-CM

## 2025-07-28 NOTE — TELEPHONE ENCOUNTER
----- Message from Nissa Montemayor PA-C sent at 7/28/2025  9:58 AM EDT -----      ----- Message -----  From: Monique Feliciano MA  Sent: 7/24/2025   3:15 PM EDT  To: Nissa Montemayor PA-C

## 2025-07-28 NOTE — TELEPHONE ENCOUNTER
Patient urine culture did show infection and is on correct antibiotic. Finish all antibiotics, and then go to lab to leave urine sample and make sure infection has completely cleared.

## 2025-08-08 ENCOUNTER — TELEPHONE (OUTPATIENT)
Dept: PRIMARY CARE | Facility: CLINIC | Age: 75
End: 2025-08-08
Payer: MEDICARE

## 2025-08-08 NOTE — TELEPHONE ENCOUNTER
Spoke with Ute at [facility] and she informed me there was urine ordered, robert presented on the 4th and was cancelled out (put back on hold)    Patient was not able to give sample or something happened during collection.     She said the patient can go back if she is still symptomatic.

## 2025-08-14 LAB
APPEARANCE UR: ABNORMAL
BACTERIA #/AREA URNS HPF: ABNORMAL /HPF
BACTERIA UR CULT: ABNORMAL
BILIRUB UR QL STRIP: NEGATIVE
COLOR UR: YELLOW
GLUCOSE UR QL STRIP: NEGATIVE
HGB UR QL STRIP: ABNORMAL
HYALINE CASTS #/AREA URNS LPF: ABNORMAL /LPF
KETONES UR QL STRIP: NEGATIVE
LEUKOCYTE ESTERASE UR QL STRIP: ABNORMAL
NITRITE UR QL STRIP: NEGATIVE
PH UR STRIP: 5.5 [PH] (ref 5–8)
PROT UR QL STRIP: ABNORMAL
RBC #/AREA URNS HPF: ABNORMAL /HPF
SERVICE CMNT-IMP: ABNORMAL
SP GR UR STRIP: 1.02 (ref 1–1.03)
SQUAMOUS #/AREA URNS HPF: ABNORMAL /HPF
WBC #/AREA URNS HPF: ABNORMAL /HPF

## 2025-09-01 ENCOUNTER — OFFICE VISIT (OUTPATIENT)
Dept: URGENT CARE | Age: 75
End: 2025-09-01
Payer: MEDICARE

## 2025-09-01 VITALS
TEMPERATURE: 97.6 F | DIASTOLIC BLOOD PRESSURE: 81 MMHG | HEART RATE: 99 BPM | RESPIRATION RATE: 16 BRPM | SYSTOLIC BLOOD PRESSURE: 149 MMHG | HEIGHT: 65 IN | WEIGHT: 293 LBS | BODY MASS INDEX: 48.82 KG/M2 | OXYGEN SATURATION: 97 %

## 2025-09-01 DIAGNOSIS — R30.0 DYSURIA: Primary | ICD-10-CM

## 2025-09-01 PROCEDURE — 99213 OFFICE O/P EST LOW 20 MIN: CPT | Performed by: NURSE PRACTITIONER

## 2025-09-01 PROCEDURE — 3077F SYST BP >= 140 MM HG: CPT | Performed by: NURSE PRACTITIONER

## 2025-09-01 PROCEDURE — 3079F DIAST BP 80-89 MM HG: CPT | Performed by: NURSE PRACTITIONER

## 2025-09-01 PROCEDURE — 1159F MED LIST DOCD IN RCRD: CPT | Performed by: NURSE PRACTITIONER

## 2025-09-01 PROCEDURE — 81003 URINALYSIS AUTO W/O SCOPE: CPT | Performed by: NURSE PRACTITIONER

## 2025-09-01 PROCEDURE — 1036F TOBACCO NON-USER: CPT | Performed by: NURSE PRACTITIONER

## 2025-09-01 RX ORDER — NITROFURANTOIN 25; 75 MG/1; MG/1
100 CAPSULE ORAL 2 TIMES DAILY
Qty: 14 CAPSULE | Refills: 0 | Status: SHIPPED | OUTPATIENT
Start: 2025-09-01 | End: 2025-09-08

## 2025-09-01 ASSESSMENT — ENCOUNTER SYMPTOMS: DYSURIA: 1

## 2025-09-03 LAB — BACTERIA UR CULT: ABNORMAL
